# Patient Record
Sex: FEMALE | Race: WHITE | NOT HISPANIC OR LATINO | Employment: FULL TIME | URBAN - METROPOLITAN AREA
[De-identification: names, ages, dates, MRNs, and addresses within clinical notes are randomized per-mention and may not be internally consistent; named-entity substitution may affect disease eponyms.]

---

## 2021-11-12 ENCOUNTER — OFFICE VISIT (OUTPATIENT)
Dept: FAMILY MEDICINE CLINIC | Facility: CLINIC | Age: 36
End: 2021-11-12
Payer: COMMERCIAL

## 2021-11-12 VITALS
DIASTOLIC BLOOD PRESSURE: 70 MMHG | SYSTOLIC BLOOD PRESSURE: 114 MMHG | OXYGEN SATURATION: 99 % | BODY MASS INDEX: 21.86 KG/M2 | WEIGHT: 131.2 LBS | HEIGHT: 65 IN | TEMPERATURE: 98.6 F | HEART RATE: 77 BPM

## 2021-11-12 DIAGNOSIS — M79.674 PAIN OF TOE OF RIGHT FOOT: Primary | ICD-10-CM

## 2021-11-12 PROBLEM — M79.675 PAIN OF TOE OF LEFT FOOT: Status: ACTIVE | Noted: 2021-11-12

## 2021-11-12 PROCEDURE — 99214 OFFICE O/P EST MOD 30 MIN: CPT | Performed by: FAMILY MEDICINE

## 2021-11-19 ENCOUNTER — OFFICE VISIT (OUTPATIENT)
Dept: FAMILY MEDICINE CLINIC | Facility: CLINIC | Age: 36
End: 2021-11-19
Payer: COMMERCIAL

## 2021-11-19 VITALS
WEIGHT: 129 LBS | DIASTOLIC BLOOD PRESSURE: 64 MMHG | OXYGEN SATURATION: 99 % | TEMPERATURE: 98.5 F | HEART RATE: 72 BPM | BODY MASS INDEX: 21.49 KG/M2 | RESPIRATION RATE: 16 BRPM | HEIGHT: 65 IN | SYSTOLIC BLOOD PRESSURE: 120 MMHG

## 2021-11-19 DIAGNOSIS — Z13.0 SCREENING FOR DEFICIENCY ANEMIA: ICD-10-CM

## 2021-11-19 DIAGNOSIS — Z13.1 SCREENING FOR DIABETES MELLITUS (DM): ICD-10-CM

## 2021-11-19 DIAGNOSIS — F33.1 MODERATE EPISODE OF RECURRENT MAJOR DEPRESSIVE DISORDER (HCC): Primary | ICD-10-CM

## 2021-11-19 DIAGNOSIS — Z13.220 SCREENING FOR HYPERLIPIDEMIA: ICD-10-CM

## 2021-11-19 PROBLEM — F32.A DEPRESSION: Status: ACTIVE | Noted: 2021-10-18

## 2021-11-19 PROCEDURE — 99204 OFFICE O/P NEW MOD 45 MIN: CPT | Performed by: FAMILY MEDICINE

## 2021-11-19 PROCEDURE — 3725F SCREEN DEPRESSION PERFORMED: CPT | Performed by: FAMILY MEDICINE

## 2021-11-19 PROCEDURE — 1036F TOBACCO NON-USER: CPT | Performed by: FAMILY MEDICINE

## 2021-11-19 RX ORDER — RIBOFLAVIN (VITAMIN B2) 100 MG
100 TABLET ORAL DAILY
COMMUNITY
End: 2021-11-29 | Stop reason: HOSPADM

## 2021-11-19 RX ORDER — ESCITALOPRAM OXALATE 5 MG/1
5 TABLET ORAL DAILY
Qty: 30 TABLET | Refills: 5 | Status: SHIPPED | OUTPATIENT
Start: 2021-11-19 | End: 2021-12-10 | Stop reason: SDUPTHER

## 2021-11-19 RX ORDER — ZINC GLUCONATE 50 MG
50 TABLET ORAL DAILY
COMMUNITY

## 2021-11-19 RX ORDER — MULTIVITAMIN
1 TABLET ORAL DAILY
COMMUNITY

## 2021-11-22 ENCOUNTER — TELEPHONE (OUTPATIENT)
Dept: PSYCHIATRY | Facility: CLINIC | Age: 36
End: 2021-11-22

## 2021-11-22 ENCOUNTER — TELEPHONE (OUTPATIENT)
Dept: FAMILY MEDICINE CLINIC | Facility: CLINIC | Age: 36
End: 2021-11-22

## 2021-11-23 ENCOUNTER — APPOINTMENT (OUTPATIENT)
Dept: LAB | Age: 36
End: 2021-11-23
Payer: COMMERCIAL

## 2021-11-23 DIAGNOSIS — Z13.0 SCREENING FOR DEFICIENCY ANEMIA: ICD-10-CM

## 2021-11-23 DIAGNOSIS — Z13.220 SCREENING FOR HYPERLIPIDEMIA: ICD-10-CM

## 2021-11-23 DIAGNOSIS — Z13.1 SCREENING FOR DIABETES MELLITUS (DM): ICD-10-CM

## 2021-11-23 DIAGNOSIS — F33.1 MODERATE EPISODE OF RECURRENT MAJOR DEPRESSIVE DISORDER (HCC): ICD-10-CM

## 2021-11-23 LAB
ALBUMIN SERPL BCP-MCNC: 4.1 G/DL (ref 3.5–5)
ALP SERPL-CCNC: 51 U/L (ref 46–116)
ALT SERPL W P-5'-P-CCNC: 19 U/L (ref 12–78)
ANION GAP SERPL CALCULATED.3IONS-SCNC: 9 MMOL/L (ref 4–13)
AST SERPL W P-5'-P-CCNC: 14 U/L (ref 5–45)
BASOPHILS # BLD AUTO: 0.06 THOUSANDS/ΜL (ref 0–0.1)
BASOPHILS NFR BLD AUTO: 1 % (ref 0–1)
BILIRUB SERPL-MCNC: 0.49 MG/DL (ref 0.2–1)
BUN SERPL-MCNC: 5 MG/DL (ref 5–25)
CALCIUM SERPL-MCNC: 10 MG/DL (ref 8.3–10.1)
CHLORIDE SERPL-SCNC: 105 MMOL/L (ref 100–108)
CHOLEST SERPL-MCNC: 146 MG/DL
CO2 SERPL-SCNC: 25 MMOL/L (ref 21–32)
CREAT SERPL-MCNC: 0.65 MG/DL (ref 0.6–1.3)
EOSINOPHIL # BLD AUTO: 0.07 THOUSAND/ΜL (ref 0–0.61)
EOSINOPHIL NFR BLD AUTO: 1 % (ref 0–6)
ERYTHROCYTE [DISTWIDTH] IN BLOOD BY AUTOMATED COUNT: 12.1 % (ref 11.6–15.1)
GFR SERPL CREATININE-BSD FRML MDRD: 116 ML/MIN/1.73SQ M
GLUCOSE P FAST SERPL-MCNC: 81 MG/DL (ref 65–99)
HCT VFR BLD AUTO: 40.8 % (ref 34.8–46.1)
HDLC SERPL-MCNC: 71 MG/DL
HGB BLD-MCNC: 13.1 G/DL (ref 11.5–15.4)
IMM GRANULOCYTES # BLD AUTO: 0.01 THOUSAND/UL (ref 0–0.2)
IMM GRANULOCYTES NFR BLD AUTO: 0 % (ref 0–2)
LDLC SERPL CALC-MCNC: 68 MG/DL (ref 0–100)
LYMPHOCYTES # BLD AUTO: 1.44 THOUSANDS/ΜL (ref 0.6–4.47)
LYMPHOCYTES NFR BLD AUTO: 27 % (ref 14–44)
MCH RBC QN AUTO: 31 PG (ref 26.8–34.3)
MCHC RBC AUTO-ENTMCNC: 32.1 G/DL (ref 31.4–37.4)
MCV RBC AUTO: 97 FL (ref 82–98)
MONOCYTES # BLD AUTO: 0.53 THOUSAND/ΜL (ref 0.17–1.22)
MONOCYTES NFR BLD AUTO: 10 % (ref 4–12)
NEUTROPHILS # BLD AUTO: 3.32 THOUSANDS/ΜL (ref 1.85–7.62)
NEUTS SEG NFR BLD AUTO: 61 % (ref 43–75)
NONHDLC SERPL-MCNC: 75 MG/DL
NRBC BLD AUTO-RTO: 0 /100 WBCS
PLATELET # BLD AUTO: 238 THOUSANDS/UL (ref 149–390)
PMV BLD AUTO: 10.9 FL (ref 8.9–12.7)
POTASSIUM SERPL-SCNC: 3.9 MMOL/L (ref 3.5–5.3)
PROT SERPL-MCNC: 7 G/DL (ref 6.4–8.2)
RBC # BLD AUTO: 4.23 MILLION/UL (ref 3.81–5.12)
SODIUM SERPL-SCNC: 139 MMOL/L (ref 136–145)
TRIGL SERPL-MCNC: 34 MG/DL
TSH SERPL DL<=0.05 MIU/L-ACNC: 1.19 UIU/ML (ref 0.36–3.74)
WBC # BLD AUTO: 5.43 THOUSAND/UL (ref 4.31–10.16)

## 2021-11-23 PROCEDURE — 84443 ASSAY THYROID STIM HORMONE: CPT

## 2021-11-23 PROCEDURE — 36415 COLL VENOUS BLD VENIPUNCTURE: CPT

## 2021-11-23 PROCEDURE — 85025 COMPLETE CBC W/AUTO DIFF WBC: CPT

## 2021-11-23 PROCEDURE — 80053 COMPREHEN METABOLIC PANEL: CPT

## 2021-11-23 PROCEDURE — 80061 LIPID PANEL: CPT

## 2021-11-29 ENCOUNTER — ANNUAL EXAM (OUTPATIENT)
Dept: OBGYN CLINIC | Facility: CLINIC | Age: 36
End: 2021-11-29
Payer: COMMERCIAL

## 2021-11-29 VITALS
WEIGHT: 131 LBS | DIASTOLIC BLOOD PRESSURE: 60 MMHG | BODY MASS INDEX: 21.83 KG/M2 | HEIGHT: 65 IN | SYSTOLIC BLOOD PRESSURE: 112 MMHG

## 2021-11-29 DIAGNOSIS — Z01.419 WOMEN'S ANNUAL ROUTINE GYNECOLOGICAL EXAMINATION: Primary | ICD-10-CM

## 2021-11-29 PROCEDURE — G0476 HPV COMBO ASSAY CA SCREEN: HCPCS | Performed by: NURSE PRACTITIONER

## 2021-11-29 PROCEDURE — S0610 ANNUAL GYNECOLOGICAL EXAMINA: HCPCS | Performed by: NURSE PRACTITIONER

## 2021-11-29 PROCEDURE — G0145 SCR C/V CYTO,THINLAYER,RESCR: HCPCS | Performed by: NURSE PRACTITIONER

## 2021-11-29 PROCEDURE — 3008F BODY MASS INDEX DOCD: CPT | Performed by: FAMILY MEDICINE

## 2021-11-29 RX ORDER — MULTIVIT WITH MINERALS/LUTEIN
TABLET ORAL
COMMUNITY

## 2021-11-30 LAB
HPV HR 12 DNA CVX QL NAA+PROBE: NEGATIVE
HPV16 DNA CVX QL NAA+PROBE: NEGATIVE
HPV18 DNA CVX QL NAA+PROBE: NEGATIVE

## 2021-12-01 LAB
LAB AP GYN PRIMARY INTERPRETATION: NORMAL
LAB AP LMP: NORMAL
Lab: NORMAL
PATH INTERP SPEC-IMP: NORMAL

## 2021-12-02 ENCOUNTER — TELEPHONE (OUTPATIENT)
Dept: OBGYN CLINIC | Facility: CLINIC | Age: 36
End: 2021-12-02

## 2021-12-08 ENCOUNTER — RA CDI HCC (OUTPATIENT)
Dept: OTHER | Facility: HOSPITAL | Age: 36
End: 2021-12-08

## 2021-12-09 ENCOUNTER — SOCIAL WORK (OUTPATIENT)
Dept: BEHAVIORAL/MENTAL HEALTH CLINIC | Facility: CLINIC | Age: 36
End: 2021-12-09
Payer: COMMERCIAL

## 2021-12-09 DIAGNOSIS — F32.1 CURRENT MODERATE EPISODE OF MAJOR DEPRESSIVE DISORDER WITHOUT PRIOR EPISODE (HCC): Primary | ICD-10-CM

## 2021-12-09 PROCEDURE — 90834 PSYTX W PT 45 MINUTES: CPT | Performed by: SOCIAL WORKER

## 2021-12-10 ENCOUNTER — TELEPHONE (OUTPATIENT)
Dept: FAMILY MEDICINE CLINIC | Facility: CLINIC | Age: 36
End: 2021-12-10

## 2021-12-17 ENCOUNTER — TELEMEDICINE (OUTPATIENT)
Dept: FAMILY MEDICINE CLINIC | Facility: CLINIC | Age: 36
End: 2021-12-17
Payer: COMMERCIAL

## 2021-12-17 VITALS — WEIGHT: 131 LBS | HEIGHT: 65 IN | BODY MASS INDEX: 21.83 KG/M2

## 2021-12-17 DIAGNOSIS — F32.1 CURRENT MODERATE EPISODE OF MAJOR DEPRESSIVE DISORDER WITHOUT PRIOR EPISODE (HCC): Primary | ICD-10-CM

## 2021-12-17 PROCEDURE — 3008F BODY MASS INDEX DOCD: CPT | Performed by: FAMILY MEDICINE

## 2021-12-17 PROCEDURE — 3725F SCREEN DEPRESSION PERFORMED: CPT | Performed by: FAMILY MEDICINE

## 2021-12-17 PROCEDURE — 1036F TOBACCO NON-USER: CPT | Performed by: FAMILY MEDICINE

## 2021-12-17 PROCEDURE — 99212 OFFICE O/P EST SF 10 MIN: CPT | Performed by: FAMILY MEDICINE

## 2022-01-19 ENCOUNTER — TELEMEDICINE (OUTPATIENT)
Dept: FAMILY MEDICINE CLINIC | Facility: CLINIC | Age: 37
End: 2022-01-19
Payer: COMMERCIAL

## 2022-01-19 VITALS — WEIGHT: 131 LBS | BODY MASS INDEX: 21.83 KG/M2 | HEIGHT: 65 IN

## 2022-01-19 DIAGNOSIS — F33.1 MODERATE EPISODE OF RECURRENT MAJOR DEPRESSIVE DISORDER (HCC): ICD-10-CM

## 2022-01-19 DIAGNOSIS — F32.1 CURRENT MODERATE EPISODE OF MAJOR DEPRESSIVE DISORDER WITHOUT PRIOR EPISODE (HCC): Primary | ICD-10-CM

## 2022-01-19 PROCEDURE — 99214 OFFICE O/P EST MOD 30 MIN: CPT | Performed by: FAMILY MEDICINE

## 2022-01-19 PROCEDURE — 3008F BODY MASS INDEX DOCD: CPT | Performed by: FAMILY MEDICINE

## 2022-01-19 PROCEDURE — 3725F SCREEN DEPRESSION PERFORMED: CPT | Performed by: FAMILY MEDICINE

## 2022-01-19 PROCEDURE — 1036F TOBACCO NON-USER: CPT | Performed by: FAMILY MEDICINE

## 2022-01-19 RX ORDER — ESCITALOPRAM OXALATE 5 MG/1
15 TABLET ORAL DAILY
Qty: 90 TABLET | Refills: 5 | Status: SHIPPED | OUTPATIENT
Start: 2022-01-19 | End: 2022-02-18

## 2022-01-19 NOTE — PROGRESS NOTES
Virtual Regular Visit    Verification of patient location:    Patient is located in the following state in which I hold an active license [place]      Assessment/Plan:    Problem List Items Addressed This Visit        Other    Depression - Primary     Symptoms continue to improve  She is going to reestablish care with her previous therapist  Will trial an increase in lexapro to 15 and if there is no change at the next visit will reduce back to 10  There are some difficult life changing decisions that Marck Stahl is working out which are likely contributing to symptoms  One goal is finding enjoyment in her previously hobbies like cooking  Fu in 6 weeks  Relevant Medications    escitalopram (LEXAPRO) 5 mg tablet               Reason for visit is   Chief Complaint   Patient presents with    Virtual Brief Visit    Virtual Regular Visit        Encounter provider Chante Rico MD    Provider located at [address] [address]      Recent Visits  No visits were found meeting these conditions  Showing recent visits within past 7 days and meeting all other requirements  Today's Visits  Date Type Provider Dept   01/19/22 Telemedicine Chante Rico MD [facility] today's visits and meeting all other requirements  Future Appointments  No visits were found meeting these conditions  Showing future appointments within next 150 days and meeting all other requirements       The patient was identified by name and date of birth  Madiha Jeffrey was informed that this is a telemedicine visit and that the visit is being conducted through [address] [place] [address] Now and patient was informed that this is a secure, HIPAA-compliant platform  She agrees to proceed     My office door was closed  No one else was in the room  She acknowledged consent and understanding of privacy and security of the video platform   The patient has agreed to participate and understands they can discontinue the visit at any time  Patient is aware this is a billable service  Josh Murillo is a 31 y o  female with a ho depression  HPI     This is a pleasant 32 yo F here for a follow up of depression  She was diagnosed for the first time 11/2021  Symptoms started after the birth of her daughter 2 years ago  She had a midwife so never followed up for a postpartum exam and postpartum depression went untreated  She was having trouble enjoying being a mother and was unhappy in her marriage which causes a lot of guilt  She was started on lexapro 5 which was increased to 10 two weeks later due to ineffectiveness  She has had her first appt with baby and me but does not want to follow up with them again  She has the phone number to contact psych if this is needed in the future  She is going to reestablish care with her previous therapist      Today she reports that she thinks the medication has been helpful  She is still trying to work out the next steps in her marriage  The PHQ 9 has been improving from 15-->10-->8  GAD7 has been negative  Past Medical History:   Diagnosis Date    Miscarriage        Past Surgical History:   Procedure Laterality Date    WISDOM TOOTH EXTRACTION         Current Outpatient Medications   Medication Sig Dispense Refill    Ascorbic Acid (vitamin C) 1000 MG tablet       Cholecalciferol 125 MCG (5000 UT) capsule Take 5,000 Units by mouth daily      escitalopram (LEXAPRO) 5 mg tablet Take 3 tablets (15 mg total) by mouth daily 90 tablet 5    Multiple Vitamin (multivitamin) tablet Take 1 tablet by mouth daily      zinc gluconate 50 mg tablet Take 50 mg by mouth daily       No current facility-administered medications for this visit  No Known Allergies    Review of Systems   Constitutional: Negative for fever and unexpected weight change  HENT: Negative for ear pain, sore throat and trouble swallowing      Eyes: Negative for pain and visual disturbance  Respiratory: Negative for cough, chest tightness, shortness of breath and wheezing  Cardiovascular: Negative for chest pain  Gastrointestinal: Negative for abdominal distention, abdominal pain, blood in stool, constipation, diarrhea, nausea and vomiting  Endocrine: Negative for polydipsia and polyuria  Genitourinary: Negative for dysuria and hematuria  Musculoskeletal: Negative for back pain and myalgias  Skin: Negative for rash  Neurological: Negative for syncope and headaches  Psychiatric/Behavioral: Negative for suicidal ideas  Video Exam    Vitals:    01/19/22 0832   Weight: 59 4 kg (131 lb)   Height: 5' 5" (1 651 m)       Physical Exam   It was my intent to perform this visit via video technology but the patient was not able to do a video connection so the visit was completed via audio telephone only  I spent 20 minutes with patient today in which greater than 50% of the time was spent in counseling/coordination of care regarding depression    VIRTUAL VISIT 53 Starla Orona verbally agrees to participate in [employer]  Pt is aware that [employer] could be limited without vital signs or the ability to perform a full hands-on physical Mustapha Adams understands she or the provider may request at any time to terminate the video visit and request the patient to seek care or treatment in person

## 2022-01-19 NOTE — ASSESSMENT & PLAN NOTE
Symptoms continue to improve  She is going to reestablish care with her previous therapist  Will trial an increase in lexapro to 15 and if there is no change at the next visit will reduce back to 10  There are some difficult life changing decisions that Bennett Villa is working out which are likely contributing to symptoms  One goal is finding enjoyment in her previously hobbies like cooking  Fu in 6 weeks

## 2022-02-23 ENCOUNTER — TELEPHONE (OUTPATIENT)
Dept: PSYCHIATRY | Facility: CLINIC | Age: 37
End: 2022-02-23

## 2022-03-17 ENCOUNTER — OFFICE VISIT (OUTPATIENT)
Dept: FAMILY MEDICINE CLINIC | Facility: CLINIC | Age: 37
End: 2022-03-17
Payer: COMMERCIAL

## 2022-03-17 VITALS
WEIGHT: 134 LBS | SYSTOLIC BLOOD PRESSURE: 122 MMHG | RESPIRATION RATE: 16 BRPM | OXYGEN SATURATION: 98 % | BODY MASS INDEX: 22.33 KG/M2 | TEMPERATURE: 99.1 F | HEART RATE: 108 BPM | DIASTOLIC BLOOD PRESSURE: 60 MMHG | HEIGHT: 65 IN

## 2022-03-17 DIAGNOSIS — F32.1 CURRENT MODERATE EPISODE OF MAJOR DEPRESSIVE DISORDER WITHOUT PRIOR EPISODE (HCC): ICD-10-CM

## 2022-03-17 DIAGNOSIS — Z00.00 ANNUAL PHYSICAL EXAM: Primary | ICD-10-CM

## 2022-03-17 DIAGNOSIS — Z13.1 SCREENING FOR DIABETES MELLITUS (DM): ICD-10-CM

## 2022-03-17 DIAGNOSIS — F33.9 DEPRESSION, RECURRENT (HCC): ICD-10-CM

## 2022-03-17 PROCEDURE — 99213 OFFICE O/P EST LOW 20 MIN: CPT | Performed by: FAMILY MEDICINE

## 2022-03-17 PROCEDURE — 1036F TOBACCO NON-USER: CPT | Performed by: FAMILY MEDICINE

## 2022-03-17 PROCEDURE — 99395 PREV VISIT EST AGE 18-39: CPT | Performed by: FAMILY MEDICINE

## 2022-03-17 PROCEDURE — 3725F SCREEN DEPRESSION PERFORMED: CPT | Performed by: FAMILY MEDICINE

## 2022-03-17 PROCEDURE — 3008F BODY MASS INDEX DOCD: CPT | Performed by: FAMILY MEDICINE

## 2022-03-17 NOTE — PROGRESS NOTES
Assessment/Plan:    Depression  Symptoms continue to improve on lexapro 15  There are some difficult life changing decisions that Romeo Gregorio is working out which are likely contributing to symptoms  She has started cooking again  At this point will keep dosage the same  Referred to behavioral therapy  Fu in 6 months  Annual physical exam  Normal physical exam   Screening labs completed in November 2021 with a normal CMP, lipid profile, CBC and TSH  No family history of breast cancer colon cancer  Pap UTD  Diagnoses and all orders for this visit:    Annual physical exam    Current moderate episode of major depressive disorder without prior episode ([address] )    Screening for diabetes mellitus (DM)  -     Basic metabolic panel; Future    Depression, recurrent (HCC)          Subjective:  Depression follow-up     Patient ID: Madi Aaron is a 30 y o  female  HPI  This is a pleasant 30 yo F here for a follow up of depression  She was diagnosed for the first time 11/2021  Symptoms started after the birth of her daughter 2 years ago  She had a midwife so never followed up for a postpartum exam and postpartum depression went untreated  She was having trouble enjoying being a mother and was unhappy in her marriage which causes a lot of guilt  She was started on lexapro 5 which was increased to 10 two weeks later due to ineffectiveness  She has had her first appt with baby and me but does not want to follow up with them again  She has the phone number to contact psych if this is needed in the future  She is going to reestablish care with her previous therapist       Today she reports that she thinks the medication has been helpful  She is still trying to work out the next steps in her marriage  The PHQ 9 has been improving from 15-->10-->8-->7  GAD7 has been negative          The following portions of the patient's history were reviewed and updated as appropriate: allergies, current medications, past family history, past medical history, past social history, past surgical history and problem list     Review of Systems   Constitutional: Negative for fever and unexpected weight change  HENT: Negative for ear pain, sore throat and trouble swallowing  Eyes: Negative for pain and visual disturbance  Respiratory: Negative for cough, chest tightness, shortness of breath and wheezing  Cardiovascular: Negative for chest pain  Gastrointestinal: Negative for abdominal distention, abdominal pain, blood in stool, constipation, diarrhea, nausea and vomiting  Endocrine: Negative for polydipsia and polyuria  Genitourinary: Negative for dysuria and hematuria  Musculoskeletal: Negative for back pain and myalgias  Skin: Negative for rash  Neurological: Negative for syncope and headaches  Psychiatric/Behavioral: Negative for suicidal ideas  PHQ-2/9 Depression Screening    Little interest or pleasure in doing things: 1 - several days  Feeling down, depressed, or hopeless: 1 - several days  Trouble falling or staying asleep, or sleeping too much: 3 - nearly every day  Feeling tired or having little energy: 1 - several days  Poor appetite or overeatin - not at all  Feeling bad about yourself - or that you are a failure or have let yourself or your family down: 1 - several days  Trouble concentrating on things, such as reading the newspaper or watching television: 0 - not at all  Moving or speaking so slowly that other people could have noticed   Or the opposite - being so fidgety or restless that you have been moving around a lot more than usual: 0 - not at all  Thoughts that you would be better off dead, or of hurting yourself in some way: 0 - not at all  PHQ-9 Score: 7   PHQ-9 Interpretation: Mild depression            Objective:      /60 (BP Location: Left arm, Patient Position: Sitting, Cuff Size: Adult)   Pulse (!) 108   Temp 99 1 °F (37 3 °C) (Tympanic)   Resp 16   Ht 5' 5" (1 651 m)   Wt 60 8 kg (134 lb)   SpO2 98%   BMI 22 30 kg/m²          Physical Exam  Constitutional:       Appearance: She is well-developed  HENT:      Head: Normocephalic and atraumatic  Right Ear: External ear normal       Left Ear: External ear normal    Eyes:      General: No scleral icterus  Conjunctiva/sclera: Conjunctivae normal    Pulmonary:      Effort: Pulmonary effort is normal  No respiratory distress  Musculoskeletal:      Cervical back: Normal range of motion  Neurological:      Mental Status: She is alert and oriented to person, place, and time

## 2022-03-17 NOTE — PROGRESS NOTES
ADULT ANNUAL [facility]    NAME: Nely Almanzar  AGE: 30 y o  SEX: female  : 1985     DATE: 3/17/2022     Assessment and Plan:     Problem List Items Addressed This Visit        Other    Depression     Symptoms continue to improve on lexapro 15  There are some difficult life changing decisions that Estella Jang is working out which are likely contributing to symptoms  She has started cooking again  At this point will keep dosage the same  Referred to behavioral therapy  Fu in 6 months  Annual physical exam - Primary     Normal physical exam   Screening labs completed in 2021 with a normal CMP, lipid profile, CBC and TSH  No family history of breast cancer colon cancer  Pap UTD  Other Visit Diagnoses     Screening for diabetes mellitus (DM)        Relevant Orders    Basic metabolic panel    Depression, recurrent ([facility] [address] )              Immunizations and preventive care screenings were discussed with patient today  Appropriate education was printed on patient's after visit summary  Vaccines UTD  Counseling:  Alcohol/drug use: discussed moderation in alcohol intake, the recommendations for healthy alcohol use, and avoidance of illicit drug use  Dental Health: discussed importance of regular tooth brushing, flossing, and dental visits  · Exercise: the importance of regular exercise/physical activity was discussed  Recommend exercise 3-5 times per week for at least 30 minutes  Return in about 6 months (around 2022) for depression   Chief Complaint:     Chief Complaint   Patient presents with    Follow-up     Depression       History of Present Illness:     Adult Annual Physical   Patient here for a comprehensive physical exam  The patient reports no problems  Diet and Physical Activity  · Diet/Nutrition: well balanced diet  · Exercise: moderate cardiovascular exercise        Depression Screening  PHQ-2/9 Depression Screening    Little interest or pleasure in doing things: 1 - several days  Feeling down, depressed, or hopeless: 1 - several days  Trouble falling or staying asleep, or sleeping too much: 3 - nearly every day  Feeling tired or having little energy: 1 - several days  Poor appetite or overeatin - not at all  Feeling bad about yourself - or that you are a failure or have let yourself or your family down: 1 - several days  Trouble concentrating on things, such as reading the newspaper or watching television: 0 - not at all  Moving or speaking so slowly that other people could have noticed  Or the opposite - being so fidgety or restless that you have been moving around a lot more than usual: 0 - not at all  Thoughts that you would be better off dead, or of hurting yourself in some way: 0 - not at all  PHQ-9 Score: 7   PHQ-9 Interpretation: Mild depression        General Health  · Sleep: sleeps poorly  Falls asleep easily but wakes up frequently  · Hearing: no issues  · Vision: no vision problems  · Dental: regular dental visits  /GYN Health  · Last menstrual period: 3/13/22  · Contraceptive method: none  · History of STDs?: no      Review of Systems:     Review of Systems   Constitutional: Negative for fever and unexpected weight change  HENT: Negative for ear pain, sore throat and trouble swallowing  Eyes: Negative for pain and visual disturbance  Respiratory: Negative for cough, chest tightness, shortness of breath and wheezing  Cardiovascular: Negative for chest pain  Gastrointestinal: Negative for abdominal distention, abdominal pain, blood in stool, constipation, diarrhea, nausea and vomiting  Endocrine: Negative for polydipsia and polyuria  Genitourinary: Negative for dysuria and hematuria  Musculoskeletal: Negative for back pain and myalgias  Skin: Negative for rash  Neurological: Negative for syncope and headaches     Psychiatric/Behavioral: Negative for suicidal ideas  Past Medical History:     Past Medical History:   Diagnosis Date    Miscarriage       Past Surgical History:     Past Surgical History:   Procedure Laterality Date    WISDOM TOOTH EXTRACTION        Social History:     Social History     Socioeconomic History    Marital status: /Civil Union     Spouse name: None    Number of children: None    Years of education: None    Highest education level: None   Occupational History    None   Tobacco Use    Smoking status: Never Smoker    Smokeless tobacco: Never Used   Vaping Use    Vaping Use: Never used   Substance and Sexual Activity    Alcohol use: Not Currently    Drug use: Not Currently    Sexual activity: Yes     Partners: Male     Birth control/protection: Condom Male   Other Topics Concern    None   Social History Narrative    None     Social Determinants of Health     Financial Resource Strain: Not on file   Food Insecurity: Not on file   Transportation Needs: Not on file   Physical Activity: Not on file   Stress: Not on file   Social Connections: Not on file   Intimate Partner Violence: Not on file   Housing Stability: Not on file      Family History:     Family History   Problem Relation Age of Onset    Emphysema Maternal Grandfather 79    Colon cancer Paternal Grandmother 80      Current Medications:     Current Outpatient Medications   Medication Sig Dispense Refill    Ascorbic Acid (vitamin C) 1000 MG tablet       Cholecalciferol 125 MCG (5000 UT) capsule Take 5,000 Units by mouth daily      escitalopram (LEXAPRO) 5 mg tablet Take 3 tablets (15 mg total) by mouth daily 90 tablet 5    Multiple Vitamin (multivitamin) tablet Take 1 tablet by mouth daily      zinc gluconate 50 mg tablet Take 50 mg by mouth daily       No current facility-administered medications for this visit        Allergies:     No Known Allergies   Physical Exam:     /60 (BP Location: Left arm, Patient Position: Sitting, Cuff Size: Adult)   Pulse (!) 108   Temp 99 1 °F (37 3 °C) (Tympanic)   Resp 16   Ht 5' 5" (1 651 m)   Wt 60 8 kg (134 lb)   SpO2 98%   BMI 22 30 kg/m²     Physical Exam  Constitutional:       Appearance: She is well-developed  HENT:      Head: Normocephalic and atraumatic  Eyes:      General: No scleral icterus  Conjunctiva/sclera: Conjunctivae normal       Pupils: Pupils are equal, round, and reactive to light  Cardiovascular:      Rate and Rhythm: Normal rate and regular rhythm  Heart sounds: Normal heart sounds  No murmur heard  No friction rub  No gallop  Pulmonary:      Effort: Pulmonary effort is normal  No respiratory distress  Breath sounds: No wheezing or rales  Chest:      Chest wall: No tenderness  Abdominal:      General: Bowel sounds are normal  There is no distension  Palpations: Abdomen is soft  There is no mass  Tenderness: There is no abdominal tenderness  Musculoskeletal:         General: Normal range of motion  Cervical back: Normal range of motion and neck supple  Lymphadenopathy:      Cervical: No cervical adenopathy  Skin:     General: Skin is warm and dry  Capillary Refill: Capillary refill takes less than 2 seconds  Findings: No rash  Neurological:      Mental Status: She is alert and oriented to person, place, and time  Cranial Nerves: No cranial nerve deficit            Joana Morelos MD   [address]

## 2022-03-17 NOTE — ASSESSMENT & PLAN NOTE
Symptoms continue to improve on lexapro 15  There are some difficult life changing decisions that Estella Jang is working out which are likely contributing to symptoms  She has started cooking again  At this point will keep dosage the same  Referred to behavioral therapy  Fu in 6 months

## 2022-03-17 NOTE — ASSESSMENT & PLAN NOTE
Normal physical exam   Screening labs completed in November 2021 with a normal CMP, lipid profile, CBC and TSH  No family history of breast cancer colon cancer  Pap UTD

## 2022-04-28 ENCOUNTER — SOCIAL WORK (OUTPATIENT)
Dept: BEHAVIORAL/MENTAL HEALTH CLINIC | Facility: CLINIC | Age: 37
End: 2022-04-28
Payer: COMMERCIAL

## 2022-04-28 DIAGNOSIS — F32.1 CURRENT MODERATE EPISODE OF MAJOR DEPRESSIVE DISORDER WITHOUT PRIOR EPISODE (HCC): Primary | ICD-10-CM

## 2022-04-28 PROCEDURE — 90834 PSYTX W PT 45 MINUTES: CPT | Performed by: SOCIAL WORKER

## 2022-05-12 ENCOUNTER — SOCIAL WORK (OUTPATIENT)
Dept: BEHAVIORAL/MENTAL HEALTH CLINIC | Facility: CLINIC | Age: 37
End: 2022-05-12
Payer: COMMERCIAL

## 2022-05-12 DIAGNOSIS — F32.1 CURRENT MODERATE EPISODE OF MAJOR DEPRESSIVE DISORDER WITHOUT PRIOR EPISODE (HCC): Primary | ICD-10-CM

## 2022-05-12 PROCEDURE — 90834 PSYTX W PT 45 MINUTES: CPT | Performed by: SOCIAL WORKER

## 2022-05-13 NOTE — PSYCH
Psychotherapy Provided: Individual Psychotherapy 45 minutes     Length of time in session: 45 minutes, follow up in 4 weeks time  Goals addressed in session: Goal 1     Pt states she is still reeling from all the stressors in her life  Pt reports she is struggling to cope with feeling like she is failing morally  Pt reports her affair with a work colleague is now out in the open  Pt reports this has not only effected her marriage, but also the person's marriage she had the affair with  Pt reports they also have young children  Pt states she is recognizing she has these self destructive tendencies of latching on to people, and making her happiness about what other people can do for her  Pt states this is why she has tended to bounce from relationship to relationship  Pt states the only reason she feels stuck in her current relationship is because of her young daughter  Pt reports if her daughter was not in the picture, she would have already taken up and left the area  Pt states this is her way of coping  Pt reports she can't do this currently, so she is left being in a toxic relationship with the person who brings the most kady to her about to leave to move back to [facility] [place] with his family  Pt reports in two weeks she feels like she is going to have nothing  Pt reports she is concerned for when her boyfriend leaves the country  Pt states her goal is to get away for weekends to visit friends, and distract herself by being with people who know her well  Pt states she will take her daughter with her for some of these weekends, as she does not want to lose the bond she has with her  Everything else in her life feels very uncertain  Pt reports this makes her feel vulnerable, sad, upset with herself  This writer and pt followed up with further conversation about ERIKA and how pt exhibits many of the symptoms of an ERIKA person   Pt states she did listen to some discussions on this, and she wants to explore it more via youtube videos and podcasts  Pt is also very much aware of her co-dependency issues, which is another symptom of ERIKA  Pt is encouraged to start to process this and identify what she can do for herself which is kind, filled with self acceptance and which can help pt to start to like herself more, and improve overall self esteem  Pt reports she knows this process will be painful, but she wants to move forward in her life  Pt is encouraged to really get the basic right, and continue to work on diet, sleep, exercise, social contact with people, having things to look forward to etc  Pt is encouraged to continue to invest in why she has the tendencies she has of struggling to open up, bouncing from one relationship to the other, poor impulse control, struggling to regulate her emotions etc  Pt to follow up with this writer in 4 weeks time  Current suicide risk : Low     Pt is not suicidal and is future oriented  Behavioral Health Treatment Plan [facility]: Diagnosis and Treatment Plan explained to Gabriela Chairez relates understanding diagnosis and is agreeable to Treatment Plan   Yes

## 2022-06-23 ENCOUNTER — SOCIAL WORK (OUTPATIENT)
Dept: BEHAVIORAL/MENTAL HEALTH CLINIC | Facility: CLINIC | Age: 37
End: 2022-06-23
Payer: COMMERCIAL

## 2022-06-23 DIAGNOSIS — F32.1 CURRENT MODERATE EPISODE OF MAJOR DEPRESSIVE DISORDER WITHOUT PRIOR EPISODE (HCC): Primary | ICD-10-CM

## 2022-06-23 PROCEDURE — 90834 PSYTX W PT 45 MINUTES: CPT | Performed by: SOCIAL WORKER

## 2022-06-26 NOTE — PSYCH
Problem List Items Addressed This Visit        Other    Depression - Primary        Data: Pt presents to this session with down affect, and generally sad, on edge, and frustrated  During the session, it is evident pt still carries a lot of guilt regarding lifestyle choices  Pt reports she feels terrible about treating her  poorly, having an affair with a  man  Pt reports she feels she has done a lot of damage  Pt reports the person she had an affair with has not moved back to [facility]  Pt is aware all the promises her made to her were not realized, and she feels stupid for believed he would leave his wife, for her  Pt reports she feels she is in an unworkable situation, and spends many hours ruminating on what she can do to change her situation  Pt reports she struggles to get to sleep at night, as she spends many long hours thinking about how she can resolve the unresolvable  Pt reports she is co-dependent and she has always looked to be rescued from her situation by someone else  Pt admits she would have already left the area, if she did not have her daughter  Pt states she has always done this in the past      Pt reports on the plus side, having a conversation with her father trying to find out hwy he is so cold and blunted  Pt reports the conversation did not go too well, but she was pleased she told him how she feels  Pt reports she is trying to do this more, and work on avoiding her usual routine of telling people what she feels like they would like to hear  Action: Pt reports not much progress since last session  Pt is able to identify the degree to which she has always run from her problems, and found reasons and excuses to run  Pt states she can't do it this time, and pt is slowly realizing this is probably good, and is going to work some maturity in her  Pt states she wants to start working on herself, and investing in herself   Pt is wanting to work on being honest and true to people around her, and not being super black and white and pretending everything is great until she decides she is done, where everything suddenly is wrong  Pt reports she understands this is how she has hurt people in the past      Plan: Pt's goals for this week is to start to say no to unproductive thought processes regarding the big picture (which can't be resolved)  Instead pt is encouraged to start with small things every day, where she identifies the good things in her life, and where she starts to embrace the people around her  Pt is encouraged to utilize the [facility] acronym and focus on things which are true, helpful, inspirational, kind and necessary  Pt is encouraged to get mindful, and to embrace her current reality and make the most of it  Pt is encouraged to identify those things which helps her mood (yoga, spending time with friends, reading inspirational stories etc)  Pt is encouraged to start to incorporate these on a larger scale  Pt to follow up in 4 weeks time  Psychotherapy Provided: Individual Psychotherapy 45 minutes     Length of time in session: 45 minutes, follow up in 3 weeks time  Goals addressed in session: Goal 1         Pain:      none    0    Current suicide risk : Low     Pt is future oriented and not suicidal      Behavioral Health Treatment Plan [facility]: Diagnosis and Treatment Plan explained to Agatha Burns relates understanding diagnosis and is agreeable to Treatment Plan   Yes

## 2022-07-15 ENCOUNTER — SOCIAL WORK (OUTPATIENT)
Dept: BEHAVIORAL/MENTAL HEALTH CLINIC | Facility: CLINIC | Age: 37
End: 2022-07-15
Payer: COMMERCIAL

## 2022-07-15 DIAGNOSIS — F32.1 CURRENT MODERATE EPISODE OF MAJOR DEPRESSIVE DISORDER WITHOUT PRIOR EPISODE (HCC): Primary | ICD-10-CM

## 2022-07-15 PROCEDURE — 90834 PSYTX W PT 45 MINUTES: CPT | Performed by: SOCIAL WORKER

## 2022-07-22 NOTE — PSYCH
1  Current moderate episode of major depressive disorder without prior episode ([facility] [address] )       Data: Pt is still looking a little shell shocked and numb during initial impression  Pt admits she just got off the phone with the person she had an affair with who is now living back in [facility]  Pt reports for some reason she keeps on picking up his calls even though she knows she does not have a future with him  Pt reports she understands why people have affairs now, and she finds herself humbled by the fact she can't hide away from the fact she has done immoral things  Pt reports she is still finding herself in this liminal space between wanting to correct all her mistakes, but knowing she can't and won't go back to how things were before she had the affair  Pt reports her  is making more of an effort, and this has been making a different, but she feels it is too little too late  Pt reports she just no longer feels attracted to him anymore  Pt still faces the same obstacles of not making enough money to move out, and unsure of what to do about their 1year old daughter  Pt reports thinking about how she can bolster her self care, further to last sessions discussion  Pt reports she is delving into spirituality and she is attracted with the Yazidism sense of mindfulness and centeredness  Pt reports she used to go to [place] with her aunt as a younger girl, but she found the self righteousness of Adventism people a little difficult to accept and does not want to go back  Pt reports she feels self care is the only way she can get through her current struggles  Pt admits she is very unhappy at work, unhappy with home life, has a difficult time with her originating family and feels unhappy living in this area of the world  A lot of unhappiness and disconnectedness   Pt is encouraged to continue to focus on daily tasks, increase her physical exercise, and work at reducing her tendency to try and fix everything (which she can't) and focus on small goals such as reaching out to people and building bridges to other people without excessive trauma dumping  Assessment: Pt is clearly going through a very difficult season with no obvious solutions  Pt is understanding more of how she got herself into this predicament, poor communication, inability to fully express emotions, co-dependent behaviors until she eventually cracked and wanted to feel alive again (affair)  Pt is on a journey of learning to relate to people better and find ways to build bridges towards people and not self isolate or remain emotionally unavailable to people  Pt reports she and her  are getting a long much better now because they are finally both putting the work in, and recognizing they needed help   is on mood stabilizer medication and is working with a therapist and doing much better  Plan: continue spiritual journey, work on making incremental small changes rather than obsessing over the big picture  Follow up in 4 weeks time  Psychotherapy Provided: Individual Psychotherapy 45 minutes     Length of time in session: 45 minutes, follow up in 4 weeks time  Goals addressed in session: Goal 1     Pain:      none    0    Current suicide risk : Low     Pt is future oriented and not suicidal        Behavioral Health Treatment Plan [facility]: Diagnosis and Treatment Plan explained to Samreen Mcdowell relates understanding diagnosis and is agreeable to Treatment Plan   Yes

## 2022-08-18 ENCOUNTER — SOCIAL WORK (OUTPATIENT)
Dept: BEHAVIORAL/MENTAL HEALTH CLINIC | Facility: CLINIC | Age: 37
End: 2022-08-18
Payer: COMMERCIAL

## 2022-08-18 DIAGNOSIS — F33.0 MILD EPISODE OF RECURRENT MAJOR DEPRESSIVE DISORDER (HCC): Primary | ICD-10-CM

## 2022-08-18 PROCEDURE — 90834 PSYTX W PT 45 MINUTES: CPT | Performed by: SOCIAL WORKER

## 2022-08-19 NOTE — PSYCH
1  Mild episode of recurrent major depressive disorder ([facility] [address] )       Data: Pt comes to this session having decompensated from last session  Depressed mood is elevated  Pt reports her  decided to have at least two months of complete separation and felt it was a good idea for them to have some distance  Pt reports she moved in with her Aunt and is going to stay there until early October  Pt reports this has been a shock to the system, and is now presenting much more of the reality of what pt's life would look like without her   This writer processed with pt in regards to what she is contemplating, where her thoughts are, and which thoughts are constructive and which thoughts are destructive  Pt reports she is still very hostile and persecutory to self, and is finding it difficult to forgive herself  Pt reports with the prospect of having to get a small apartment by herself, she is realizing that leaving her  will be a serious net loss for her  However, pt does not feel attracted sexually to her  anymore, but then pt is also thinking is life all about feeling the buzz of love  Is there a practical side to life, too  Pt is encouraged to write down these things on paper, and process whether leaving her  is the most practical solution to her current crisis  This writer and pt also discussed her tendency to avoid and run away and her coping mechanisms for crisis have always been destructive  Pt reports she feels like the grass is always greener on the other side, but when she gets there she thinks the grass is greener where she just came from  Pt reports she feels depressed, unmotivated, lost, sad, tearful  Pt is aware of the need to maintain self care, and she is choosing to go to some concerts, and work on herself  Pt has little pleasure in being around her friends, but she is forcing herself to keep on going       Assessment: Without pt's daughter, pt admits she would have already left and started another relationship with someone, and pushed back her memories of her marriage  The child has provided a lot more accountability to pt and she is having to work though things she typically would have avoided  This writer feels this is a good thing for pt  She is having to face her demons  Pt is also thinking about ways she can adjust and be more accepting of her  and she admits he is more than willing to give marriage a second go  Pt is clearly depressed and tearful and in shock at how her life has turned this corner  Pt is aware she is sown bad seeds, and she is processing what she can do about it  Pt is working on her self care, and this writer encouraged pt to remain hopeful and be around people, and continue to work on self during this process  Plan: Follow up in 4 weeks time  Psychotherapy Provided: Individual Psychotherapy 45 minutes     Length of time in session: 45 minutes, follow up in 4 weeks time  Goals addressed in session: Goal 1     Pain:      none    0    Current suicide risk : Low     Pt is future oriented and not suicidal        Behavioral Health Treatment Plan [facility]: Diagnosis and Treatment Plan explained to Mallika Livingston relates understanding diagnosis and is agreeable to Treatment Plan   Yes

## 2022-08-22 ENCOUNTER — OFFICE VISIT (OUTPATIENT)
Dept: FAMILY MEDICINE CLINIC | Facility: CLINIC | Age: 37
End: 2022-08-22
Payer: COMMERCIAL

## 2022-08-22 VITALS
HEIGHT: 65 IN | TEMPERATURE: 99.9 F | HEART RATE: 60 BPM | DIASTOLIC BLOOD PRESSURE: 78 MMHG | RESPIRATION RATE: 16 BRPM | BODY MASS INDEX: 22.99 KG/M2 | SYSTOLIC BLOOD PRESSURE: 118 MMHG | WEIGHT: 138 LBS

## 2022-08-22 DIAGNOSIS — K42.9 UMBILICAL HERNIA WITHOUT OBSTRUCTION AND WITHOUT GANGRENE: Primary | ICD-10-CM

## 2022-08-22 PROCEDURE — 99213 OFFICE O/P EST LOW 20 MIN: CPT | Performed by: FAMILY MEDICINE

## 2022-08-22 NOTE — PATIENT INSTRUCTIONS
Umbilical Hernia   AMBULATORY CARE:   An umbilical hernia  is a bulge through the abdominal wall near your umbilicus (belly button)  The hernia may contain tissue from the abdomen, part of an organ (such as the intestine), or fluid  Signs and symptoms:  Umbilical hernias usually do not cause any pain  Your hernia may disappear when you lay flat  You may have any of the following:  A bulge or swelling in or near your belly button    A bulge that gets bigger when you cough, strain to have a bowel movement, or sit up    Nausea or vomiting    Constipation    Seek care immediately if:   Your hernia gets bigger, feels firm, or turns blue or purple  You have severe abdominal pain with nausea or vomiting  You stop having bowel movements and passing gas  You have blood in your bowel movement  Contact your healthcare provider if:   You have a fever  You have nausea or are vomiting  You are constipated  You have questions or concerns about your condition or care  Self-care:   Drink more liquids  Liquids may prevent constipation and straining during a bowel movement  This can prevent your hernia from getting bigger  Ask how much liquid you should drink each day and which liquids are best for you  Eat high-fiber foods  Fiber may prevent constipation and straining during a bowel movement  This can prevent your hernia from getting bigger  Foods that contain fiber include fruits, vegetables, legumes, and whole grains  Avoid heavy lifting  Heavy lifting can put pressure on your hernia and make it bigger  Ask your healthcare provider how much is safe to lift  Do not place anything over your umbilical hernia  Do not place tape or a coin over the hernia  This treatment does not help treat a hernia  Follow up with your healthcare provider as directed: You may need to see a surgeon to plan for hernia repair  Write down your questions so you remember to ask them during your visits    © Copyright [employer] 2022 Information is for End User's use only and may not be sold, redistributed or otherwise used for commercial purposes  All illustrations and images included in CareNotes® are the copyrighted property of A D A M , Inc  or Nancy Banuelos  The above information is an  only  It is not intended as medical advice for individual conditions or treatments  Talk to your doctor, nurse or pharmacist before following any medical regimen to see if it is safe and effective for you

## 2022-08-22 NOTE — PROGRESS NOTES
Assessment/Plan:    Umbilical hernia without obstruction and without gangrene  Patient has reducible umbilical hernia  Discussed with patient that given that she is not interested in surgery, would increase fiber in diet and drink plenty of water to stay hydrated  Patient will need to help decrease abdominal pressure to help decrease incidence of herniation of hernia  Once patient decides that she wants surgery or if pain is as severe, would recommend follow-up with a general surgeon for further evaluation and elective ventral hernia repair  Discussed etiology and pathology of umbilical hernias  Additional reading materials given to patient  Follow-up as needed    20 minutes spent with patient  Diagnoses and all orders for this visit:    Umbilical hernia without obstruction and without gangrene        Subjective:   Chief Complaint   Patient presents with    Hernia     By her belly button     Health Maintenance   Topic Date Due    Hepatitis C Screening  Never done    HIV Screening  Never done    DTaP,Tdap,and Td Vaccines (1 - Tdap) Never done    COVID-19 Vaccine (3 - Booster for Moderna series) 09/14/2021    Influenza Vaccine (1) 09/01/2022    BMI: Adult  03/17/2023    Annual Physical  03/17/2023    Cervical Cancer Screening  11/29/2026    Pneumococcal Vaccine: Pediatrics (0 to 5 Years) and At-Risk Patients (6 to 59 Years)  Aged Out    HIB Vaccine  Aged Out    Hepatitis B Vaccine  Aged Out    IPV Vaccine  Aged Out    Hepatitis A Vaccine  Aged Out    Meningococcal ACWY Vaccine  Aged Out    HPV Vaccine  Aged Out        Patient ID: Joselyn Christopher is a 30 y o  female  HPI    Patient presents for follow-up after visiting urgent care for a umbilical hernia  States that she 1st noticed this several weeks ago after lifting heavy boxes  Was told to press on hernia to help reduce it  Does have some slight abdominal discomfort but no significant pain  No OTC medications    Has family history of a family member with a ventral hernia  Not interested in surgery at this time  The following portions of the patient's history were reviewed and updated as appropriate: allergies, current medications, past family history, past medical history, past social history, past surgical history, and problem list     Review of Systems   Constitutional: Negative for chills and fever  Respiratory: Negative for cough and shortness of breath  Cardiovascular: Negative for chest pain and palpitations  Gastrointestinal: Negative for diarrhea, nausea and vomiting  Umbilical hernia   Musculoskeletal: Negative for myalgias  Neurological: Negative for dizziness and headaches  Objective:  /78   Pulse 60   Temp 99 9 °F (37 7 °C) (Tympanic)   Resp 16   Ht 5' 5" (1 651 m)   Wt 62 6 kg (138 lb)   BMI 22 96 kg/m²      Physical Exam  Vitals and nursing note reviewed  Constitutional:       General: She is not in acute distress  Eyes:      Conjunctiva/sclera: Conjunctivae normal    Cardiovascular:      Rate and Rhythm: Normal rate and regular rhythm  Pulses: Normal pulses  Heart sounds: No murmur heard  Pulmonary:      Effort: Pulmonary effort is normal  No respiratory distress  Breath sounds: No wheezing  Abdominal:      Palpations: Abdomen is soft  Comments: Roughly 1 cm umbilical hernia noted  Reducible  No overlying skin changes  Musculoskeletal:         General: No swelling  Neurological:      Mental Status: She is alert  This note has been constructed using a voice recognition system  There may be translation, syntax, or grammatical errors  If you have an questions, please contact the dictating provider

## 2022-08-22 NOTE — ASSESSMENT & PLAN NOTE
Patient has reducible umbilical hernia  Discussed with patient that given that she is not interested in surgery, would increase fiber in diet and drink plenty of water to stay hydrated  Patient will need to help decrease abdominal pressure to help decrease incidence of herniation of hernia  Once patient decides that she wants surgery or if pain is as severe, would recommend follow-up with a general surgeon for further evaluation and elective ventral hernia repair  Discussed etiology and pathology of umbilical hernias  Additional reading materials given to patient     Follow-up as needed

## 2022-09-09 ENCOUNTER — SOCIAL WORK (OUTPATIENT)
Dept: BEHAVIORAL/MENTAL HEALTH CLINIC | Facility: CLINIC | Age: 37
End: 2022-09-09
Payer: COMMERCIAL

## 2022-09-09 DIAGNOSIS — F32.1 DEPRESSION, MAJOR, SINGLE EPISODE, MODERATE (HCC): Primary | ICD-10-CM

## 2022-09-09 PROCEDURE — 90834 PSYTX W PT 45 MINUTES: CPT | Performed by: SOCIAL WORKER

## 2022-09-12 NOTE — PSYCH
1  Depression, major, single episode, moderate (HCC)         Data: Pt reports moving back in with her  after their 3year old was experiencing too much disruption  Pt reports she recognizes that she is living in between two worlds and she does not know the path forward  Pt reports her husbands family have some ill will towards her, and pt understand why  Pt also recognizes she wants the comforts of living at home, yet she also has said she no longer wants to continue with her   However, pt also reports she and her  have never communicated better, and they are going on a co-parenting class together  Pt feels there is more potential for  and pt reconciling than before  Pt reports she is still very persecutory to self  Pt states she does not know how to forgive herself, and feels like she can't do this  Pt recognizes the benefit of having a God who forgives but pt does not have such a belief system  Pt reports she is interested in the meditation, mindfulness, self acceptance aspect of mental health and she is willing to look into some of the evidence on how things like guided meditation and chanting/ yoga can help in these areas  However, pt still feels doing this kind of activity is a little self absorbing, and to someone who is still quite hostile to self, this is a difficult thing to start practicing  However, in this conversation, it is apparent that pt is struggling to come to terms with the fact she does not like herself very much  This is a problem for pt who is trying to work on depression management and improve general self care  Pt recognizes she is a little stuck until she gets over this payal  Pt does agree that we all make mistakes, and have some sort of moral failure in our lives  Pt also accepts that the most important aspect of making mistakes, is learning from them, and being contrite  Pt is in agreement with this too   Pt reports she is still spending a lot of time ruminating in unhelpful ways in to the night, and her sleep is being impacted  Pt and this writer discussed the importance of using the [address] for improved thinking, and encourages pt to also apply it to her own self  Pt agrees with this  Assessment: Session was productive  Pt has to put into practice better thinking, and improve self acceptance  Pt appears to be looking to atone for her mistakes, but this is not a work which can be accomplished it is an act of choosing  Pt has always identified her worth by what she does, which is why she is having such a hard time accepting herself at present  Pt is encouraged to go on a mindfulness journey and try and improve self love and self acceptance, above any value pt garners from what she does for a living to the talents and gifts she possesses  Plan: Look into meditation, mindfulness literature sent to your inbox  Follow up in 2 weeks time  Psychotherapy Provided: Individual Psychotherapy 45 minutes     Length of time in session: 45 minutes, follow up in 2 weeks time  Goals addressed in session: Goal 1     Pain:      none    0    Current suicide risk : Low     Pt is future oriented and not suicidal        Behavioral Health Treatment Plan [facility]: Diagnosis and Treatment Plan explained to Steven Cyr relates understanding diagnosis and is agreeable to Treatment Plan   Yes

## 2022-10-07 ENCOUNTER — SOCIAL WORK (OUTPATIENT)
Dept: BEHAVIORAL/MENTAL HEALTH CLINIC | Facility: CLINIC | Age: 37
End: 2022-10-07
Payer: COMMERCIAL

## 2022-10-07 DIAGNOSIS — F33.0 MILD EPISODE OF RECURRENT MAJOR DEPRESSIVE DISORDER (HCC): Primary | ICD-10-CM

## 2022-10-07 PROCEDURE — 90834 PSYTX W PT 45 MINUTES: CPT | Performed by: SOCIAL WORKER

## 2022-10-11 NOTE — PSYCH
1  Mild episode of recurrent major depressive disorder ([facility] [address] )       Data: Pt presents to this writer with ongoing depression, mild  Pt states she is doing a little better  Pt reports she is back living with her , and they are starting to share living space again  Pt reports they are communicating well, going to couples therapy, and learning to co-parent  Pt reports the time she had away from her  was helpful, as she realized she wants a family, and she wants her daughter to have two parents  Pt reports she is continuing the processing in regards to what she wants in life  Pt reports she still see the frustration she has with her , who most likely has undiagnosed ADHD, and his behavior frustrated her immensely  However, pt is also recognizing that she has a great house, stability, economic health, and she likes her   This writer suggests, most of pt's frustrations come from some of the selfish aspects her  has, but also the fact she is bored at times in the relationship, and even bored a little, sexually  This writer and pt discussed what she is looking for in life, and that she could easily find a new relationship, which could frustrate her just as much as her current relationship  Pt is encouraged to also look at the filter from which she tends to make many of her choices  Pt tends to be a runner, and always running to the next thing  Pt has grown up in an environment where she never had to be all that accountable  Pt never had a great relationship with her parents and siblings, and for pt most friendships are expendable when the circumstances are not in her favor  Pt is now , and this commitment requires choices pt has not always been good at making  This writer encouraged pt to try and work through this as a character issue, and not just a choice issue   Pt and this writer also discussed how ADHD works, and the importance of changing expectations living with someone with ADHD, and methods to increase success in the relationship with accountability, and scaffolding  Pt finds this frustrating, as she does not want to be the person who hold her  accountable, as she tends to avoid conflict  This writer suggests this is probably the reason the relationship is struggling, because her  needs radical accountability and short term reward to keep him motivated  Pt is encouraged to follow up with marriage counselor in regard to this  Assessment: This writer is gently trying to give pt an opportunity to see some of her selfishness with some of her decision making and expectations  Pt is starting to see that the way she has led her life up until marriage is not going to work for a marriage commitment  At some point, pt has to make a choice as to what kind of life she wants to build  Plan: Follow up with this writer in 4 weeks time  Psychotherapy Provided: Individual Psychotherapy 45 minutes     Length of time in session: 45 minutes from 3:05 to 3:50, follow up in 4 weeks time  Goals addressed in session: Goal 1     Pain:      none    0    Current suicide risk : Low     Pt is future oriented and not suicidal        Behavioral Health Treatment Plan [facility]: Diagnosis and Treatment Plan explained to Boris Licea relates understanding diagnosis and is agreeable to Treatment Plan   Yes

## 2022-11-09 ENCOUNTER — ANNUAL EXAM (OUTPATIENT)
Dept: OBGYN CLINIC | Facility: CLINIC | Age: 37
End: 2022-11-09

## 2022-11-09 VITALS — BODY MASS INDEX: 23.46 KG/M2 | WEIGHT: 141 LBS | SYSTOLIC BLOOD PRESSURE: 120 MMHG | DIASTOLIC BLOOD PRESSURE: 70 MMHG

## 2022-11-09 DIAGNOSIS — Z01.419 WOMEN'S ANNUAL ROUTINE GYNECOLOGICAL EXAMINATION: Primary | ICD-10-CM

## 2022-11-09 PROBLEM — M79.674 PAIN OF TOE OF RIGHT FOOT: Status: RESOLVED | Noted: 2021-11-12 | Resolved: 2022-11-09

## 2022-11-09 NOTE — PROGRESS NOTES
Subjective    HPI:     Jeff Roblero is a 30 y o  female  She is a [place] 2 Para 1, with a  3 years ago  Her menstrual cycles are regular and predictable  Her current method of contraception includes condoms  She denies issues with intimacy  She denies /GI and Gyn complaints  She feels safe at home  She states her depression/anxiety is stable  She sees her counselor 2-3 weeks  Medical, surgical and family history reviewed  Her dental care is up-to-date  She eats a healthy diet and exercises  She is not happy with her weight  Gynecologic History    Patient's last menstrual period was 10/20/2022  Last Pap: 21  Results were: normal    Obstetric History    OB History    Para Term  AB Living   2 1 1   1 1   SAB IAB Ectopic Multiple Live Births   1       1      # Outcome Date GA Lbr Morales/2nd Weight Sex Delivery Anes PTL Lv   2 Term 01/10/19 41w0d  3884 g (8 lb 9 oz) F Vag-Spont  Y NADIRA   1 SAB                The following portions of the patient's history were reviewed and updated as appropriate: allergies, current medications, past family history, past medical history, past social history, past surgical history and problem list     Review of Systems    Pertinent items are noted in HPI  Objective    Physical Exam  Constitutional:       Appearance: Normal appearance  She is well-developed  Genitourinary:      Vulva, bladder and urethral meatus normal       No lesions in the vagina  Right Labia: No rash, tenderness, lesions, skin changes or Bartholin's cyst      Left Labia: No tenderness, lesions, skin changes, Bartholin's cyst or rash  No labial fusion noted  No inguinal adenopathy present in the right or left side  No vaginal discharge, erythema, tenderness, bleeding or granulation tissue  No vaginal prolapse present  No vaginal atrophy present  Right Adnexa: not tender, not full and no mass present       Left Adnexa: not tender, not full and no mass present  Cervix is parous  No cervical motion tenderness, discharge, friability, lesion, polyp or nabothian cyst       Uterus is not enlarged, tender, irregular or prolapsed  No uterine mass detected  Uterus is anteverted  Pelvic exam was performed with patient in the lithotomy position  Breasts: Breasts are symmetrical       Right: No inverted nipple, mass, nipple discharge, skin change, tenderness, axillary adenopathy or supraclavicular adenopathy  Left: No inverted nipple, mass, nipple discharge, skin change, tenderness, axillary adenopathy or supraclavicular adenopathy  HENT:      Head: Normocephalic and atraumatic  Neck:      Thyroid: No thyromegaly  Cardiovascular:      Rate and Rhythm: Normal rate and regular rhythm  Heart sounds: Normal heart sounds, S1 normal and S2 normal    Pulmonary:      Effort: Pulmonary effort is normal       Breath sounds: Normal breath sounds  Abdominal:      General: Bowel sounds are normal  There is no distension  Palpations: Abdomen is soft  There is no mass  Tenderness: There is no abdominal tenderness  There is no guarding  Hernia: There is no hernia in the left inguinal area or right inguinal area  Musculoskeletal:      Cervical back: Neck supple  Lymphadenopathy:      Cervical: No cervical adenopathy  Upper Body:      Right upper body: No supraclavicular or axillary adenopathy  Left upper body: No supraclavicular or axillary adenopathy  Lower Body: No right inguinal adenopathy  No left inguinal adenopathy  Neurological:      Mental Status: She is alert  Skin:     General: Skin is warm and dry  Findings: No rash  Psychiatric:         Attention and Perception: Attention and perception normal          Mood and Affect: Mood and affect normal          Speech: Speech normal          Behavior: Behavior is cooperative  Thought Content:  Thought content normal          Cognition and Memory: Cognition and memory normal          Judgment: Judgment normal    Vitals and nursing note reviewed  Assessment and Plan    Romeo Zuluaga was seen today for gynecologic exam     Diagnoses and all orders for this visit:    Women's annual routine gynecological examination      Patient informed of a Stable GYN exam  A pap smear was not performed due to a normal pap in 2021  I have discussed the importance of exercise and healthy diet as well as adequate intake of calcium and vitamin D  The current ASCCP guidelines were reviewed  The low risk patient will receive pap smear screening every 3 years until the age of 34 and then every 3 to 5 years with HPV co-testing from the ages of 33-67  I emphasized the importance of an annual pelvic and breast exam  A yearly mammogram is recommended for breast cancer screening starting at age 36  Results will be released to [facility], if abnormal will call to review and discuss treatment plan  All questions have been answered to her satisfaction  Contraception: condoms  Follow up in: 1 year or sooner if needed

## 2022-11-17 ENCOUNTER — SOCIAL WORK (OUTPATIENT)
Dept: BEHAVIORAL/MENTAL HEALTH CLINIC | Facility: CLINIC | Age: 37
End: 2022-11-17

## 2022-11-17 DIAGNOSIS — F32.1 CURRENT MODERATE EPISODE OF MAJOR DEPRESSIVE DISORDER WITHOUT PRIOR EPISODE (HCC): Primary | ICD-10-CM

## 2022-11-18 NOTE — PSYCH
1  Current moderate episode of major depressive disorder without prior episode ([facility] [address] )          Data: Pt reports increase in depression over the last 2 to 3 weeks  Pt reports she moved back in with her , and pt reports their marriage is going back to the old ways   has stopped going to therapy, no longer taking medicine  Pt reports she feels very frustrated   is no longer working on himself in a way that pt feels is showing her he cares  Pt reports she is also finding herself in a difficult position of fighting his low self esteem, where she is trying to be honest with him, which is back firing, as he goes into a self pity tirade which becomes counter productive  Pt also admits she can be type a and have very high standards  This appears to cause problems in the marriage  Her  is also no longer going to therapy, and this is something pt feels very strongly would help her   Pt reports as she watches her  decompensate, she is also struggling to feel hopeful about her current life  Pt endorses poor motivation, negative thoughts, feelings of isolation, sadness, frustration and anger  Pt has changed her job, and she feels this will be a positive move in her life  New job starts on Monday  Pt and this writer discussed the importance of pt continuing to work on herself  Pt still has a lot of guilt, and a lot of guilt surrounding the care of her daughter  Pt has been neglecting to go to the gym because she feels bad for her   Pt is encouraged to process this, and find solutions by communicating to her  in a way he understands  Couples counseling is also recommended for pt as this seems to be the major trigger of much of her depression  Pt is encouraged to develop a road map of self care, which supports her own needs while also building time into her life to talk to her  and work to support and encourage him       Assessment: Pt has decompensated since last session, which appears to be a reflection on her frustration with her marriage  Pt has also been neglecting herself, and her own self care in this process  Pt recognizes what she is doing, and that she is falling into previous habits, and is ready to make some changes  Plan: follow up in 2 weeks time  Psychotherapy Provided: Individual Psychotherapy 45 minutes     Length of time in session: 45 minutes, follow up in 4 weeks time  Goals addressed in session: Goal 1     Pain:      none    0    Current suicide risk : Low     Pt is future oriented and not suicidal        Behavioral Health Treatment Plan [facility]: Diagnosis and Treatment Plan explained to Leonela Perry relates understanding diagnosis and is agreeable to Treatment Plan   Yes     Visit start and stop times:    11/18/22  Start Time: 0300  Stop Time: 0345  Total Visit Time: 45 minutes

## 2022-12-02 ENCOUNTER — SOCIAL WORK (OUTPATIENT)
Dept: BEHAVIORAL/MENTAL HEALTH CLINIC | Facility: CLINIC | Age: 37
End: 2022-12-02

## 2022-12-02 DIAGNOSIS — F33.0 MILD EPISODE OF RECURRENT MAJOR DEPRESSIVE DISORDER (HCC): Primary | ICD-10-CM

## 2022-12-14 ENCOUNTER — OFFICE VISIT (OUTPATIENT)
Dept: FAMILY MEDICINE CLINIC | Facility: CLINIC | Age: 37
End: 2022-12-14

## 2022-12-14 VITALS
BODY MASS INDEX: 23.66 KG/M2 | DIASTOLIC BLOOD PRESSURE: 60 MMHG | WEIGHT: 142 LBS | OXYGEN SATURATION: 98 % | RESPIRATION RATE: 16 BRPM | HEART RATE: 75 BPM | TEMPERATURE: 99.1 F | SYSTOLIC BLOOD PRESSURE: 110 MMHG | HEIGHT: 65 IN

## 2022-12-14 DIAGNOSIS — Z13.0 SCREENING FOR DEFICIENCY ANEMIA: ICD-10-CM

## 2022-12-14 DIAGNOSIS — Z00.00 ANNUAL PHYSICAL EXAM: Primary | ICD-10-CM

## 2022-12-14 DIAGNOSIS — F32.9 REACTIVE DEPRESSION: ICD-10-CM

## 2022-12-14 DIAGNOSIS — Z13.1 SCREENING FOR DIABETES MELLITUS (DM): ICD-10-CM

## 2022-12-14 RX ORDER — BUPROPION HYDROCHLORIDE 150 MG/1
150 TABLET ORAL EVERY MORNING
Qty: 30 TABLET | Refills: 5 | Status: SHIPPED | OUTPATIENT
Start: 2022-12-14

## 2022-12-14 NOTE — PATIENT INSTRUCTIONS

## 2022-12-14 NOTE — PROGRESS NOTES
ADULT ANNUAL [facility]    NAME: Liane Aviles  AGE: 30 y o  SEX: female  : 1985     DATE: 2022     Assessment and Plan:     Problem List Items Addressed This Visit        Other    Reactive depression     Trial wellbutrin  Pt will call in 2 weeks if effect is inadequate  Will consider increasing wellbutrin at that time or adding back lexapro  Relevant Medications    buPROPion (Wellbutrin XL) 150 mg 24 hr tablet    Annual physical exam - Primary     Normal physical exam   Screening labs completed in 2021 with a normal CMP, lipid profile, CBC and TSH  No family history of breast cancer colon cancer  Pap UTD  Other Visit Diagnoses     Screening for deficiency anemia        Screening for diabetes mellitus (DM)              Immunizations and preventive care screenings were discussed with patient today  Appropriate education was printed on patient's after visit summary  Counseling:  Alcohol/drug use: discussed moderation in alcohol intake, the recommendations for healthy alcohol use, and avoidance of illicit drug use  Dental Health: discussed importance of regular tooth brushing, flossing, and dental visits  Exercise: the importance of regular exercise/physical activity was discussed  Recommend exercise 3-5 times per week for at least 30 minutes  Return in 1 year (on 2023)  Chief Complaint:     Chief Complaint   Patient presents with   • Physical Exam      History of Present Illness:     Adult Annual Physical   Patient here for a comprehensive physical exam  The patient reports no problems  Diet and Physical Activity  Diet/Nutrition: well balanced diet  Exercise: moderate cardiovascular exercise  Depression Screening  PHQ-2/9 Depression Screening         General Health  Sleep: sleeps well  Hearing: no issues  Vision: no vision problems  Dental: regular dental visits  /GYN Health  Last menstrual period: monthly   Contraceptive method: none  History of STDs?: no      Review of Systems:     Review of Systems   Constitutional: Negative for fever and unexpected weight change  HENT: Negative for ear pain, sore throat and trouble swallowing  Eyes: Negative for pain and visual disturbance  Respiratory: Negative for cough, chest tightness, shortness of breath and wheezing  Cardiovascular: Negative for chest pain  Gastrointestinal: Negative for abdominal distention, abdominal pain, blood in stool, constipation, diarrhea, nausea and vomiting  Endocrine: Negative for polydipsia and polyuria  Genitourinary: Negative for dysuria and hematuria  Musculoskeletal: Negative for back pain and myalgias  Skin: Negative for rash  Neurological: Negative for syncope and headaches  Psychiatric/Behavioral: Negative for suicidal ideas        Past Medical History:     Past Medical History:   Diagnosis Date   • Miscarriage       Past Surgical History:     Past Surgical History:   Procedure Laterality Date   • WISDOM TOOTH EXTRACTION        Social History:     Social History     Socioeconomic History   • Marital status: /Civil Union     Spouse name: None   • Number of children: None   • Years of education: None   • Highest education level: None   Occupational History   • None   Tobacco Use   • Smoking status: Never   • Smokeless tobacco: Never   Vaping Use   • Vaping Use: Never used   Substance and Sexual Activity   • Alcohol use: Not Currently   • Drug use: Not Currently     Types: Marijuana   • Sexual activity: Yes     Partners: Male     Birth control/protection: Condom Male   Other Topics Concern   • None   Social History Narrative   • None     Social Determinants of Health     Financial Resource Strain: Not on file   Food Insecurity: Not on file   Transportation Needs: Not on file   Physical Activity: Not on file   Stress: Not on file   Social Connections: Not on file Intimate Partner Violence: Not on file   Housing Stability: Not on file      Family History:     Family History   Problem Relation Age of Onset   • Emphysema Maternal Grandfather 79   • Colon cancer Paternal Grandmother 80      Current Medications:     Current Outpatient Medications   Medication Sig Dispense Refill   • buPROPion (Wellbutrin XL) 150 mg 24 hr tablet Take 1 tablet (150 mg total) by mouth every morning 30 tablet 5   • Multiple Vitamin (multivitamin) tablet Take 1 tablet by mouth daily     • zinc gluconate 50 mg tablet Take 50 mg by mouth daily       No current facility-administered medications for this visit  Allergies:     No Known Allergies   Physical Exam:     /60 (BP Location: Left arm, Patient Position: Sitting, Cuff Size: Adult)   Pulse 75   Temp 99 1 °F (37 3 °C) (Tympanic)   Resp 16   Ht 5' 5" (1 651 m)   Wt 64 4 kg (142 lb)   SpO2 98%   BMI 23 63 kg/m²     Physical Exam  Constitutional:       Appearance: She is well-developed  HENT:      Head: Normocephalic and atraumatic  Eyes:      General: No scleral icterus  Conjunctiva/sclera: Conjunctivae normal       Pupils: Pupils are equal, round, and reactive to light  Cardiovascular:      Rate and Rhythm: Normal rate and regular rhythm  Heart sounds: Normal heart sounds  No murmur heard  No friction rub  No gallop  Pulmonary:      Effort: Pulmonary effort is normal  No respiratory distress  Breath sounds: No wheezing or rales  Chest:      Chest wall: No tenderness  Abdominal:      General: Bowel sounds are normal  There is no distension  Palpations: Abdomen is soft  There is no mass  Tenderness: There is no abdominal tenderness  Musculoskeletal:         General: Normal range of motion  Cervical back: Normal range of motion and neck supple  Lymphadenopathy:      Cervical: No cervical adenopathy  Skin:     General: Skin is warm and dry        Capillary Refill: Capillary refill takes less than 2 seconds  Findings: No rash  Neurological:      Mental Status: She is alert and oriented to person, place, and time  Cranial Nerves: No cranial nerve deficit            Henry Tovar MD   [address] [place] [address]

## 2022-12-14 NOTE — PROGRESS NOTES
Assessment/Plan:    Annual physical exam  Normal physical exam   Screening labs completed in November 2021 with a normal CMP, lipid profile, CBC and TSH  No family history of breast cancer colon cancer  Pap UTD  Reactive depression  Trial wellbutrin  Pt will call in 2 weeks if effect is inadequate  Will consider increasing wellbutrin at that time or adding back lexapro  Diagnoses and all orders for this visit:    Annual physical exam    Reactive depression  -     buPROPion (Wellbutrin XL) 150 mg 24 hr tablet; Take 1 tablet (150 mg total) by mouth every morning    Screening for deficiency anemia  -     Cancel: Basic metabolic panel; Future    Screening for diabetes mellitus (DM)  -     Cancel: CBC and Platelet; Future        Subjective: depression     Patient ID: Tiffanie Navas is a 30 y o  female  HPI  Pt would like to restart lexapro  She only noticed minimal improvement  The following portions of the patient's history were reviewed and updated as appropriate: allergies, current medications, past family history, past medical history, past social history, past surgical history and problem list     Review of Systems   Constitutional: Negative for fever and unexpected weight change  HENT: Negative for ear pain, sore throat and trouble swallowing  Eyes: Negative for pain and visual disturbance  Respiratory: Negative for cough, chest tightness, shortness of breath and wheezing  Cardiovascular: Negative for chest pain  Gastrointestinal: Negative for abdominal distention, abdominal pain, blood in stool, constipation, diarrhea, nausea and vomiting  Endocrine: Negative for polydipsia and polyuria  Genitourinary: Negative for dysuria and hematuria  Musculoskeletal: Negative for back pain and myalgias  Skin: Negative for rash  Neurological: Negative for syncope and headaches  Psychiatric/Behavioral: Negative for suicidal ideas           PHQ-2/9 Depression Screening [unfilled]    Objective:      /60 (BP Location: Left arm, Patient Position: Sitting, Cuff Size: Adult)   Pulse 75   Temp 99 1 °F (37 3 °C) (Tympanic)   Resp 16   Ht 5' 5" (1 651 m)   Wt 64 4 kg (142 lb)   SpO2 98%   BMI 23 63 kg/m²          Physical Exam  Constitutional:       Appearance: She is well-developed  HENT:      Head: Normocephalic and atraumatic  Right Ear: External ear normal       Left Ear: External ear normal       Mouth/Throat:      Pharynx: No oropharyngeal exudate  Eyes:      General: No scleral icterus  Conjunctiva/sclera: Conjunctivae normal       Pupils: Pupils are equal, round, and reactive to light  Cardiovascular:      Rate and Rhythm: Normal rate and regular rhythm  Heart sounds: No murmur heard  No friction rub  No gallop  Pulmonary:      Effort: Pulmonary effort is normal  No respiratory distress  Breath sounds: Normal breath sounds  No wheezing or rales  Abdominal:      General: Bowel sounds are normal  There is no distension  Palpations: Abdomen is soft  There is no mass  Tenderness: There is no abdominal tenderness  There is no rebound  Musculoskeletal:         General: Normal range of motion  Cervical back: Normal range of motion and neck supple  Skin:     General: Skin is warm and dry  Neurological:      Mental Status: She is alert and oriented to person, place, and time

## 2022-12-14 NOTE — ASSESSMENT & PLAN NOTE
Trial wellbutrin  Pt will call in 2 weeks if effect is inadequate  Will consider increasing wellbutrin at that time or adding back lexapro
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
## 2023-01-12 DIAGNOSIS — F32.9 REACTIVE DEPRESSION: ICD-10-CM

## 2023-01-12 RX ORDER — BUPROPION HYDROCHLORIDE 300 MG/1
300 TABLET ORAL EVERY MORNING
Qty: 30 TABLET | Refills: 2 | Status: SHIPPED | OUTPATIENT
Start: 2023-01-12 | End: 2023-03-27 | Stop reason: ALTCHOICE

## 2023-02-10 ENCOUNTER — SOCIAL WORK (OUTPATIENT)
Dept: BEHAVIORAL/MENTAL HEALTH CLINIC | Facility: CLINIC | Age: 38
End: 2023-02-10

## 2023-02-10 DIAGNOSIS — F33.1 MODERATE EPISODE OF RECURRENT MAJOR DEPRESSIVE DISORDER (HCC): Primary | ICD-10-CM
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
## 2023-03-09 ENCOUNTER — SOCIAL WORK (OUTPATIENT)
Dept: BEHAVIORAL/MENTAL HEALTH CLINIC | Facility: CLINIC | Age: 38
End: 2023-03-09

## 2023-03-09 DIAGNOSIS — F33.1 MODERATE EPISODE OF RECURRENT MAJOR DEPRESSIVE DISORDER (HCC): Primary | ICD-10-CM

## 2023-03-09 NOTE — PSYCH
1  Moderate episode of recurrent major depressive disorder ([facility] [place] [address] )          Data: Pt struggling with increase in depression, with anxious disposition  Pt reports she is very stuck on feeling overwhelmed, angry and general shame and guilt over many aspects of her life  Job is not going well  Pt generally is unhappy and feeling stuck  Pt admits she has stopped taking good care of herself, and she is honing in on every negative event and every example of people and life being harsh and unsupportive  Pt and this writer went back to the importance of pt focusing on what she is in control of  Pt reports there are very few examples of this in her life with many aspects of her life feeling out of control  This writer suggests pt focus on her own health and physical body  Pt has stopped working out, and this is the single thing which impacts her mood the most and which pt feels the most benefit from in terms of feeling less depressed and less stuck  Pt reports she finds she does not have much time to work out, but this is a by product of difficult relationship with her  who is ADHD and struggles with doing things by himself and managing his own life  However, this leaves pt feeling very frustrated and angry that her  sets the boundaries for her life, and impacts her feeling of being in control of even small aspects of her life  This is probably not entirely true, but pt feels it is  This writer suggests pt has to have some non negotiable's in her life and allocate time for work out on at least 4 times a week  Pt needs to find ways to help  understand this, and work on relying on him in more areas- or finding time herself by getting up a little earlier- or finding outside sources of support  Assessment: Pt feels and looks stuck  More unkept, and tearful than any time she has been coming to therapy with this writer   Pt is struggling at work, and this is adding extra stress to her, and she is tired of working full time, and doing much of the heavy lifting with her daughter  Pt is encouraged to find ways to take back some control, and work on herself during this period of her life  She has a vacation planned for end of April, and this seems like it is coming at a good time  Plan: Pt is encouraged to follow up with this writer in 4 weeks time  Psychotherapy Provided: Individual Psychotherapy 45 minutes     Length of time in session: 45 minutes, follow up in 4 weeks time  Goals addressed in session: Goal 1     Pain:      none    0    Current suicide risk : Low     Pt is future oriented and not suicidal        Behavioral Health Treatment Plan [facility]: Diagnosis and Treatment Plan explained to Darius Hewitt relates understanding diagnosis and is agreeable to Treatment Plan   Yes     Visit start and stop times:    03/09/23  Start Time: 1203  Stop Time: 1248  Total Visit Time: 45 minutes

## 2023-03-27 DIAGNOSIS — F33.9 DEPRESSION, RECURRENT (HCC): Primary | ICD-10-CM

## 2023-03-27 RX ORDER — ESCITALOPRAM OXALATE 20 MG/1
20 TABLET ORAL DAILY
Qty: 90 TABLET | Refills: 1 | Status: SHIPPED | OUTPATIENT
Start: 2023-03-27

## 2023-04-06 ENCOUNTER — TELEPHONE (OUTPATIENT)
Dept: FAMILY MEDICINE CLINIC | Facility: CLINIC | Age: 38
End: 2023-04-06

## 2023-04-06 NOTE — TELEPHONE ENCOUNTER
Patient called, she has an appt scheduled for today and she was wondering if this appt could be changed to a virtual  Patient says she has a lot going on and will not be able to come in to the office

## 2023-05-05 ENCOUNTER — SOCIAL WORK (OUTPATIENT)
Dept: BEHAVIORAL/MENTAL HEALTH CLINIC | Facility: CLINIC | Age: 38
End: 2023-05-05

## 2023-05-05 DIAGNOSIS — F33.1 MODERATE EPISODE OF RECURRENT MAJOR DEPRESSIVE DISORDER (HCC): Primary | ICD-10-CM

## 2023-05-05 NOTE — PSYCH
1  Moderate episode of recurrent major depressive disorder ([facility] [address] )          Data: pt and writer reconnect after several weeks  Pt reports she is doing ok  Pt reports she and her  had a great vacation a few weeks ago  Pt reports the intimacy was there, and they were on the same page regarding childcare and discipline, and pt reports it felt good to be on the same page and working together  Since getting back, things have decompensated  Much of this is related to pt feeling like she is the bad  and her  is the good    lets their daughter watch you tube videos endlessly  This has resulted in jamison having behavioral issues with pt when she is asked to do simple things  Pt reports this is all related to poor parenting, and pt is very frustrated by this  Pt and this writer discussed the importance of pt and  being on the same page, and pt feels much of her husbands concerns step from him having ADHD and low self esteem  Pt is still trying to help her  get the right kind of help and treatment to manage his symptoms  Howoever, all this leaves pt feeling burnt out, frustrated and angry at times  Pt feels she is putting 80% of the work in, and not getting much back in return  Pt has the insight to recognize the other good time in her relationship was while her husbands father was in the hospital and she was offering care and support to her  while he was struggling with the impending death of his father  Pt notes she was slipping back into care taker role, which helps her feel wanted and needed and pt admits she is good at this  What she is not good at is constantly taking care of her , who does not want to help himself  Pt also admits she sounds like a broken record when it comes to this  Pt is encouraged to identify boundaries, and things she absolutely has to see in order to stay in the relationship   Pt is also encouraged to not allow her own self care needs to be boxed out by her husbands unwillingness to do  or do tasks which are uninteresting to him  Pt reports in order to feel highter levels of self esteem she wants to do some volunteer work, work at [employer], donate blood  But all these things bring about a negative response from her  who is upset he has to do more at home, while she is away doing these things  Pt might be able to solve some of these problems outside of her  by dropping off her daughter at [facility], but pt is reluctant to do this, as she feels her  should be stepping up more  Assessment: Pt and  would probably benefit from individual therapy as well as couples therapy  Pt is tending to get stuck over the same areas, and she is frustrated by what she can't fix  Pt is encouraged to come up with solutions where possible, and to absolutely ensure she is getting time for self care, exercise, and those things which lift her spirits and work as protective factors against depression  Plan: Follow up in 2 weeks time  Psychotherapy Provided: Individual Psychotherapy 45 minutes     Length of time in session: 45 minutes, follow up in 2 weeks time  Goals addressed in session: Goal 1     Pain:      none    0    Current suicide risk : Low     Pt is future oriented and not suicidal        Behavioral Health Treatment Plan [facility]: Diagnosis and Treatment Plan explained to Tim Current relates understanding diagnosis and is agreeable to Treatment Plan   Yes     Visit start and stop times:    05/05/23  Start Time: 3788  Stop Time: 1249  Total Visit Time: 45 minutes

## 2023-05-19 ENCOUNTER — SOCIAL WORK (OUTPATIENT)
Dept: BEHAVIORAL/MENTAL HEALTH CLINIC | Facility: CLINIC | Age: 38
End: 2023-05-19

## 2023-05-19 DIAGNOSIS — F33.0 MILD EPISODE OF RECURRENT MAJOR DEPRESSIVE DISORDER (HCC): Primary | ICD-10-CM

## 2023-05-19 NOTE — PSYCH
1  Mild episode of recurrent major depressive disorder ([facility] [address] )          Data: Pt is tearful during today's session  Pt feels overwhelmed at work  Feels overwhelmed at home  Pt admits she needs to feel like she is accomplishing things in order to feel better about herself  Pt reports she is not happy at work, does not feel supported (even though her colleagues are nice)  The general theme in pt's life is struggling to get ahead  Pt also reports more financial pressures as cars age, and cost of living gets higher  Pt and writer discussed how to best use her time, and how to change her environment to try and increase success  Pt may need to switch jobs into a job environment which is more predictable and clearer chain of command and job responsibilities  Pt is also still struggling in her marriage   likely has ADHD  This writer suggests that if pt demands one thing, it is that her  gets help he needs  Pt is even encouraged to drive him to an appointment, even if it takes up some of her time  This is time well spent  Pt is still struggling with feeling unaccomplished at times  This writer suggests the many areas where she feels a lack of order and control are mirrored in her own struggles for self worth and meaning  This is why this writer suggests pt try and do as many meaningful things as possible, including her daughter and  if they want to tag along  Pt is encouraged to make things happen, build things, clean things, go the the park, exercise and use up all her spare time to do productive meaningful things  Assessment: Pt looks a little defeated today, and is struggling to feel hopeful about her current job, marriage and home  Seems like a lot of things are on the edge  Pt is encouraged to continue to work through these things, but really focus on her self care and be productive as much as possible by finding things she can control, work on and improve       Plan: Follow up in 3 weeks time        Psychotherapy Provided: Individual Psychotherapy 45 minutes     Length of time in session: 45 minutes, follow up in 3 weeks time  Goals addressed in session: Goal 1     Pain:      none    0    Current suicide risk : Low     Pt is future oriented and not suicidal        Behavioral Health Treatment Plan [facility]: Diagnosis and Treatment Plan explained to [place] relates understanding diagnosis and is agreeable to Treatment Plan   Yes     Visit start and stop times:    05/19/23  Start Time: 1202  Stop Time: 1247  Total Visit Time: 45 minutes

## 2023-09-11 ENCOUNTER — TELEPHONE (OUTPATIENT)
Dept: PSYCHIATRY | Facility: CLINIC | Age: 38
End: 2023-09-11

## 2023-09-11 NOTE — LETTER
Dear Ana Maria Velasquez : We are contacting you because your name is currently included on the [address] [place] [address] wait-list for Talk Therapy and/or Medication Management. (Please [place] which services are needed)     In our efforts to provide the highest quality care, [facility] has begun the process of upgrading our behavioral health systems to increase efficiency and expedite delivery of services. As part of this process, we ask you to please confirm your continued interest in the services above. If you are no longer interested or in need, please nikolas “No” in the area below. If you are still interested and in need, please nikolas “Yes” and provide your most current demographic and insurance information within 15 days. If we do not receive confirmation from you by 2023 your information will not be included in the system upgrade and your place on the waitlist will be lost.     Thank you in advance for your patience and understanding and we apologize for any inconvenience this may cause. Patient Name and :    Still in need of services: Yes or No     Current Address:     Phone#:     Best time to receive a call: Insurance Carrier:      Policy/ID#: Group#: Insurance Services Phone#:      What is your current presenting problem? Open to virtual talk therapy: Yes or No      We will call you to do an Intake when an appointment becomes available. You can send this information back to us in any of the ways below:    Email: [email or web address] Sukhi Reagan  Fax#:  [phone]  Mail:   [address] [place] [address], [address] [place] [address]

## 2023-09-11 NOTE — TELEPHONE ENCOUNTER
Sent Wait List Letter VIA [employer] . Verify patients need of services from wait list after 15 days.  If no communication remove from NON-REFERRAL wait list.

## 2023-10-01 DIAGNOSIS — F33.9 DEPRESSION, RECURRENT (HCC): ICD-10-CM

## 2023-10-02 RX ORDER — ESCITALOPRAM OXALATE 20 MG/1
20 TABLET ORAL DAILY
Qty: 90 TABLET | Refills: 1 | Status: SHIPPED | OUTPATIENT
Start: 2023-10-02

## 2023-10-12 ENCOUNTER — OFFICE VISIT (OUTPATIENT)
Dept: FAMILY MEDICINE CLINIC | Facility: CLINIC | Age: 38
End: 2023-10-12
Payer: COMMERCIAL

## 2023-10-12 VITALS
HEIGHT: 65 IN | DIASTOLIC BLOOD PRESSURE: 78 MMHG | TEMPERATURE: 98.7 F | HEART RATE: 93 BPM | OXYGEN SATURATION: 98 % | BODY MASS INDEX: 23.57 KG/M2 | RESPIRATION RATE: 18 BRPM | SYSTOLIC BLOOD PRESSURE: 126 MMHG | WEIGHT: 141.5 LBS

## 2023-10-12 DIAGNOSIS — R53.83 OTHER FATIGUE: ICD-10-CM

## 2023-10-12 DIAGNOSIS — E55.9 VITAMIN D DEFICIENCY: ICD-10-CM

## 2023-10-12 DIAGNOSIS — F33.1 MODERATE EPISODE OF RECURRENT MAJOR DEPRESSIVE DISORDER (HCC): Primary | ICD-10-CM

## 2023-10-12 PROCEDURE — 99215 OFFICE O/P EST HI 40 MIN: CPT | Performed by: FAMILY MEDICINE

## 2023-10-12 RX ORDER — FLUOXETINE 10 MG/1
10 CAPSULE ORAL DAILY
Qty: 30 CAPSULE | Refills: 0 | Status: SHIPPED | OUTPATIENT
Start: 2023-10-12 | End: 2023-11-11

## 2023-10-12 NOTE — PROGRESS NOTES
Name: Cain Saha      : 1985      MRN: [id number]  Encounter Provider: Irma Garcia MD  Encounter Date: 10/12/2023   Encounter department: [address] [place] [address]     1. Moderate episode of recurrent major depressive disorder ([address] )  -     Ambulatory referral to [employer]; Future  -     FLUoxetine (PROzac) 10 mg capsule; Take 1 capsule (10 mg total) by mouth daily    2. Vitamin D deficiency  -     Vitamin D 25 hydroxy; Future    3. Other fatigue  -     CBC and Platelet; Future  -     TSH, 3rd generation with Free T4 reflex; Future  -     Basic metabolic panel; Future    Obtain lab work listed above. Referral made to psychiatry. Also provided patient with information regarding psychology today. Lexapro caused her to have low energy in the past.  We will trial her on Prozac 10 mg. Will likely need to increase to 20 mg. We will follow back up in 4 weeks. Extensive time spent counseling patient seen below. I have spent a total time of 50 minutes on 10/12/23 in caring for this patient including Diagnostic results, Prognosis, Risks and benefits of tx options, Instructions for management, Patient and family education, Importance of tx compliance, Risk factor reductions, Impressions, Counseling / Coordination of care, Documenting in the medical record, Reviewing / ordering tests, medicine, procedures  , and Obtaining or reviewing history  . Subjective      Patient presents with:  Medication Problem: Patient is very emotional , she was on anti depressant medications and would like to be put back on it. She also needs some support for mental health. Started After she gave birth. Has felt sad since 2019. Never diagnosed with post partum. Marriage problems  diagnosed with ADHD. Counselor kaylee now seeing  she needs a new therapist.   Starting a new job. Wants to stop ups and downs. Doesn't have a single kady. She works full-time.  is a good dad. Never has a second to relax. Review of Systems   Constitutional:  Positive for fatigue. Negative for fever. HENT:  Negative for sore throat. Eyes:  Negative for visual disturbance. Respiratory:  Negative for cough, chest tightness and shortness of breath. Cardiovascular:  Negative for chest pain, palpitations and leg swelling. Gastrointestinal:  Negative for abdominal pain, constipation, diarrhea and nausea. Endocrine: Negative for cold intolerance and heat intolerance. Genitourinary:  Negative for flank pain. Musculoskeletal:  Negative for back pain and neck pain. Skin:  Negative for rash. Neurological:  Negative for headaches. Psychiatric/Behavioral:  Positive for dysphoric mood. Negative for behavioral problems, confusion, sleep disturbance and suicidal ideas. Current Outpatient Medications on File Prior to Visit   Medication Sig   • Multiple Vitamin (multivitamin) tablet Take 1 tablet by mouth daily   • [DISCONTINUED] zinc gluconate 50 mg tablet Take 50 mg by mouth daily   • [DISCONTINUED] escitalopram (LEXAPRO) 20 mg tablet take 1 tablet by mouth once daily       Objective     /78 (BP Location: Left arm, Patient Position: Sitting, Cuff Size: Standard)   Pulse 93   Temp 98.7 °F (37.1 °C) (Tympanic)   Resp 18   Ht 5' 5" (1.651 m)   Wt 64.2 kg (141 lb 8 oz)   LMP 09/28/2023 (Exact Date)   SpO2 98%   BMI 23.55 kg/m²     Physical Exam  Vitals and nursing note reviewed. Constitutional:       Appearance: She is well-developed. HENT:      Head: Normocephalic and atraumatic. Cardiovascular:      Rate and Rhythm: Normal rate and regular rhythm. Pulmonary:      Effort: Pulmonary effort is normal.      Breath sounds: Normal breath sounds. Neurological:      General: No focal deficit present. Mental Status: She is alert. Psychiatric:         Mood and Affect: Mood is depressed. Affect is tearful. Thought Content:  Thought content does not include suicidal ideation. Thought content does not include homicidal or suicidal plan.        Michell Infante MD

## 2023-10-16 ENCOUNTER — APPOINTMENT (OUTPATIENT)
Dept: LAB | Facility: MEDICAL CENTER | Age: 38
End: 2023-10-16
Payer: COMMERCIAL

## 2023-10-16 DIAGNOSIS — R53.83 OTHER FATIGUE: ICD-10-CM

## 2023-10-16 DIAGNOSIS — E55.9 VITAMIN D DEFICIENCY: ICD-10-CM

## 2023-10-16 LAB
25(OH)D3 SERPL-MCNC: 24.8 NG/ML (ref 30–100)
ANION GAP SERPL CALCULATED.3IONS-SCNC: 7 MMOL/L
BUN SERPL-MCNC: 7 MG/DL (ref 5–25)
CALCIUM SERPL-MCNC: 10 MG/DL (ref 8.4–10.2)
CHLORIDE SERPL-SCNC: 104 MMOL/L (ref 96–108)
CO2 SERPL-SCNC: 28 MMOL/L (ref 21–32)
CREAT SERPL-MCNC: 0.68 MG/DL (ref 0.6–1.3)
ERYTHROCYTE [DISTWIDTH] IN BLOOD BY AUTOMATED COUNT: 12.6 % (ref 11.6–15.1)
GFR SERPL CREATININE-BSD FRML MDRD: 112 ML/MIN/1.73SQ M
GLUCOSE P FAST SERPL-MCNC: 81 MG/DL (ref 65–99)
HCT VFR BLD AUTO: 41.2 % (ref 34.8–46.1)
HGB BLD-MCNC: 13.4 G/DL (ref 11.5–15.4)
MCH RBC QN AUTO: 30.7 PG (ref 26.8–34.3)
MCHC RBC AUTO-ENTMCNC: 32.5 G/DL (ref 31.4–37.4)
MCV RBC AUTO: 95 FL (ref 82–98)
PLATELET # BLD AUTO: 240 THOUSANDS/UL (ref 149–390)
PMV BLD AUTO: 11.5 FL (ref 8.9–12.7)
POTASSIUM SERPL-SCNC: 4.2 MMOL/L (ref 3.5–5.3)
RBC # BLD AUTO: 4.36 MILLION/UL (ref 3.81–5.12)
SODIUM SERPL-SCNC: 139 MMOL/L (ref 135–147)
TSH SERPL DL<=0.05 MIU/L-ACNC: 2.02 UIU/ML (ref 0.45–4.5)
WBC # BLD AUTO: 6 THOUSAND/UL (ref 4.31–10.16)

## 2023-10-16 PROCEDURE — 82306 VITAMIN D 25 HYDROXY: CPT

## 2023-10-16 PROCEDURE — 85027 COMPLETE CBC AUTOMATED: CPT

## 2023-10-16 PROCEDURE — 80048 BASIC METABOLIC PNL TOTAL CA: CPT

## 2023-10-16 PROCEDURE — 36415 COLL VENOUS BLD VENIPUNCTURE: CPT

## 2023-10-16 PROCEDURE — 84443 ASSAY THYROID STIM HORMONE: CPT

## 2023-11-02 ENCOUNTER — ANNUAL EXAM (OUTPATIENT)
Dept: OBGYN CLINIC | Facility: CLINIC | Age: 38
End: 2023-11-02

## 2023-11-02 ENCOUNTER — TELEPHONE (OUTPATIENT)
Dept: PSYCHIATRY | Facility: CLINIC | Age: 38
End: 2023-11-02

## 2023-11-02 VITALS
WEIGHT: 144 LBS | BODY MASS INDEX: 23.99 KG/M2 | HEIGHT: 65 IN | DIASTOLIC BLOOD PRESSURE: 66 MMHG | SYSTOLIC BLOOD PRESSURE: 112 MMHG

## 2023-11-02 DIAGNOSIS — Z01.419 WOMEN'S ANNUAL ROUTINE GYNECOLOGICAL EXAMINATION: Primary | ICD-10-CM

## 2023-11-02 NOTE — PROGRESS NOTES
Subjective    HPI:     Anny Higginbotham is a 41 y.o. female. In a stable relationship for 15 years. She is a [place], with a . Her menstrual cycles are regular with occasional intramenstrual bleeding. Her current method of contraception includes condoms. She denies issues with intimacy. She denies /GI and Gyn complaints. She feels safe at home. She  is working on getting her  depression stable. She was started on Prozac 3 weeks ago. Medical, surgical and family history reviewed. Her dental care is up-to-date. She eats a healthy diet. She is not exercising. Visit Vitals  /66 (BP Location: Left arm, Patient Position: Sitting, Cuff Size: Standard)   Ht 5' 5" (1.651 m)   Wt 65.3 kg (144 lb)   LMP 10/18/2023 (Exact Date)   BMI 23.96 kg/m²   OB Status Having periods   Smoking Status Never   BSA 1.72 m²       Gynecologic History    Patient's last menstrual period was 2023 (exact date). Last Pap: 21. Results were: normal    Obstetric and Medical History    OB History    Para Term  AB Living   2 1 1   1 1   SAB IAB Ectopic Multiple Live Births   1       1      # Outcome Date GA Lbr Morales/2nd Weight Sex Delivery Anes PTL Lv   2 Term 01/10/19 41w0d  3884 g (8 lb 9 oz) F Vag-Spont  Y NADIRA   1 SAB                Past Medical History:   Diagnosis Date    Depression 10/18/2021    Miscarriage        Past Surgical History:   Procedure Laterality Date    WISDOM TOOTH EXTRACTION         The following portions of the patient's history were reviewed and updated as appropriate: allergies, current medications, past family history, past medical history, past social history, past surgical history, and problem list.    Review of Systems    Pertinent items are noted in HPI. Objective    Physical Exam  Constitutional:       Appearance: Normal appearance. She is well-developed. Genitourinary:      Vulva, bladder and urethral meatus normal.      No lesions in the vagina.       Right Labia: No rash, tenderness, lesions, skin changes or Bartholin's cyst.     Left Labia: No tenderness, lesions, skin changes, Bartholin's cyst or rash. No labial fusion noted. No inguinal adenopathy present in the right or left side. No vaginal discharge, erythema, tenderness, bleeding or granulation tissue. No vaginal prolapse present. No vaginal atrophy present. Right Adnexa: not tender, not full and no mass present. Left Adnexa: not tender, not full and no mass present. Cervix is parous. No cervical motion tenderness, discharge, friability, lesion, polyp or nabothian cyst.      Uterus is not enlarged, tender, irregular or prolapsed. No uterine mass detected. Uterus is anteverted. Pelvic exam was performed with patient in the lithotomy position. Breasts:     Breasts are symmetrical.      Right: No inverted nipple, mass, nipple discharge, skin change or tenderness. Left: No inverted nipple, mass, nipple discharge, skin change or tenderness. HENT:      Head: Normocephalic and atraumatic. Neck:      Thyroid: No thyromegaly. Cardiovascular:      Rate and Rhythm: Normal rate and regular rhythm. Heart sounds: Normal heart sounds, S1 normal and S2 normal.   Pulmonary:      Effort: Pulmonary effort is normal.      Breath sounds: Normal breath sounds. Abdominal:      General: Bowel sounds are normal. There is no distension. Palpations: Abdomen is soft. There is no mass. Tenderness: There is no abdominal tenderness. There is no guarding. Hernia: There is no hernia in the left inguinal area or right inguinal area. Musculoskeletal:      Cervical back: Neck supple. Lymphadenopathy:      Cervical: No cervical adenopathy. Upper Body:      Right upper body: No supraclavicular or axillary adenopathy. Left upper body: No supraclavicular or axillary adenopathy. Lower Body: No right inguinal adenopathy.  No left inguinal adenopathy. Neurological:      Mental Status: She is alert. Skin:     General: Skin is warm and dry. Findings: No rash. Psychiatric:         Attention and Perception: Attention and perception normal.         Mood and Affect: Mood and affect normal.         Speech: Speech normal.         Behavior: Behavior is cooperative. Thought Content: Thought content normal.         Cognition and Memory: Cognition and memory normal.         Judgment: Judgment normal.   Vitals and nursing note reviewed. Assessment and Plan    Diagnoses and all orders for this visit:    Women's annual routine gynecological examination      Patient informed of a Stable GYN exam. A pap smear was not performed due to a negative pap in 2021. I have discussed the importance of exercise and healthy diet as well as adequate intake of calcium and vitamin D. The current ASCCP guidelines were reviewed. The low risk patient will receive pap smear screening every 3 years until the age of 34 and then every 3 to 5 years with HPV co-testing from the ages of 32-69. I emphasized the importance of an annual pelvic and breast exam. A yearly mammogram is recommended for breast cancer screening starting at age 36. Results will be released to [facility], if abnormal will call to review and discuss treatment plan. All questions have been answered to her satisfaction. Contraception: condoms. Follow up in: 1 year and sooner if needed.

## 2023-11-06 ENCOUNTER — RA CDI HCC (OUTPATIENT)
Dept: OTHER | Facility: HOSPITAL | Age: 38
End: 2023-11-06

## 2023-11-07 DIAGNOSIS — F33.1 MODERATE EPISODE OF RECURRENT MAJOR DEPRESSIVE DISORDER (HCC): ICD-10-CM

## 2023-11-07 RX ORDER — FLUOXETINE 10 MG/1
10 CAPSULE ORAL DAILY
Qty: 30 CAPSULE | Refills: 0 | Status: SHIPPED | OUTPATIENT
Start: 2023-11-07 | End: 2023-11-13 | Stop reason: SDUPTHER

## 2023-11-13 ENCOUNTER — OFFICE VISIT (OUTPATIENT)
Dept: FAMILY MEDICINE CLINIC | Facility: CLINIC | Age: 38
End: 2023-11-13
Payer: COMMERCIAL

## 2023-11-13 VITALS
OXYGEN SATURATION: 99 % | HEIGHT: 65 IN | HEART RATE: 71 BPM | DIASTOLIC BLOOD PRESSURE: 78 MMHG | WEIGHT: 145.5 LBS | RESPIRATION RATE: 17 BRPM | BODY MASS INDEX: 24.24 KG/M2 | TEMPERATURE: 98.5 F | SYSTOLIC BLOOD PRESSURE: 122 MMHG

## 2023-11-13 DIAGNOSIS — F33.1 MODERATE EPISODE OF RECURRENT MAJOR DEPRESSIVE DISORDER (HCC): Primary | ICD-10-CM

## 2023-11-13 DIAGNOSIS — E55.9 VITAMIN D DEFICIENCY: ICD-10-CM

## 2023-11-13 PROCEDURE — 99214 OFFICE O/P EST MOD 30 MIN: CPT | Performed by: FAMILY MEDICINE

## 2023-11-13 RX ORDER — ERGOCALCIFEROL 1.25 MG/1
50000 CAPSULE ORAL WEEKLY
Qty: 12 CAPSULE | Refills: 0 | Status: SHIPPED | OUTPATIENT
Start: 2023-11-13 | End: 2024-01-30

## 2023-11-13 RX ORDER — FLUOXETINE HYDROCHLORIDE 20 MG/1
20 CAPSULE ORAL DAILY
Qty: 30 CAPSULE | Refills: 2 | Status: SHIPPED | OUTPATIENT
Start: 2023-11-13 | End: 2024-02-11

## 2023-11-13 NOTE — PROGRESS NOTES
Name: Octavia Estrada      : 1985      MRN: [id number]  Encounter Provider: Cyndi Bain MD  Encounter Date: 2023   Encounter department: [address] [place] [address]     1. Moderate episode of recurrent major depressive disorder (HCC)  -     FLUoxetine (PROzac) 20 mg capsule; Take 1 capsule (20 mg total) by mouth daily    2. Vitamin D deficiency  -     ergocalciferol (VITAMIN D2) 50,000 units; Take 1 capsule (50,000 Units total) by mouth once a week for 12 doses       No response to Prozac 10 mg. Increase Prozac to 20 mg daily. Continue to monitor symptoms. Patient will send us a Tradier message over the next 2 weeks to update us on response to medication. Can increase to 40 mg if needed. Can also consider adding Wellbutrin 150 mg in addition if needed. Patient will work on establishing with therapist.    Subjective      Patient presents with:  Depression: 1 mo follow up    Continues to suffer from depression. No response to 10 mg of Prozac. Tried reaching out to multiple therapists but is having trouble scheduling an appointment. Denies any thoughts of self-harm or suicide. She remains tearful in office today. Review of Systems   Constitutional:  Negative for fatigue and fever. HENT:  Negative for sore throat. Eyes:  Negative for visual disturbance. Respiratory:  Negative for cough, chest tightness and shortness of breath. Cardiovascular:  Negative for chest pain, palpitations and leg swelling. Gastrointestinal:  Negative for abdominal pain, constipation, diarrhea and nausea. Endocrine: Negative for cold intolerance and heat intolerance. Genitourinary:  Negative for flank pain. Musculoskeletal:  Negative for back pain and neck pain. Skin:  Negative for rash. Neurological:  Negative for headaches. Psychiatric/Behavioral:  Positive for dysphoric mood. Negative for behavioral problems, confusion, self-injury and suicidal ideas.  The patient is not nervous/anxious and is not hyperactive. Current Outpatient Medications on File Prior to Visit   Medication Sig   • Multiple Vitamin (multivitamin) tablet Take 1 tablet by mouth daily   • [DISCONTINUED] FLUoxetine (PROzac) 10 mg capsule take 1 capsule by mouth once daily       Objective     /78 (BP Location: Left arm, Patient Position: Sitting, Cuff Size: Standard)   Pulse 71   Temp 98.5 °F (36.9 °C) (Tympanic)   Resp 17   Ht 5' 5" (1.651 m)   Wt 66 kg (145 lb 8 oz)   LMP 10/18/2023 (Exact Date)   SpO2 99%   BMI 24.21 kg/m²     Physical Exam  Vitals and nursing note reviewed. Constitutional:       Appearance: She is well-developed. HENT:      Head: Normocephalic and atraumatic. Cardiovascular:      Rate and Rhythm: Normal rate and regular rhythm. Pulmonary:      Effort: Pulmonary effort is normal.      Breath sounds: Normal breath sounds. Neurological:      General: No focal deficit present. Mental Status: She is alert. Psychiatric:         Mood and Affect: Mood is depressed. Mood is not anxious. Affect is tearful.        Manjeet Rivers MD

## 2023-12-27 ENCOUNTER — OFFICE VISIT (OUTPATIENT)
Dept: FAMILY MEDICINE CLINIC | Facility: CLINIC | Age: 38
End: 2023-12-27
Payer: COMMERCIAL

## 2023-12-27 VITALS
DIASTOLIC BLOOD PRESSURE: 76 MMHG | TEMPERATURE: 97.4 F | RESPIRATION RATE: 16 BRPM | BODY MASS INDEX: 23.74 KG/M2 | SYSTOLIC BLOOD PRESSURE: 126 MMHG | HEART RATE: 76 BPM | OXYGEN SATURATION: 97 % | HEIGHT: 65 IN | WEIGHT: 142.5 LBS

## 2023-12-27 DIAGNOSIS — Z00.00 ANNUAL PHYSICAL EXAM: Primary | ICD-10-CM

## 2023-12-27 DIAGNOSIS — F33.1 MODERATE EPISODE OF RECURRENT MAJOR DEPRESSIVE DISORDER (HCC): ICD-10-CM

## 2023-12-27 PROCEDURE — 99395 PREV VISIT EST AGE 18-39: CPT | Performed by: FAMILY MEDICINE

## 2023-12-27 RX ORDER — FLUOXETINE HYDROCHLORIDE 20 MG/1
20 CAPSULE ORAL DAILY
Qty: 90 CAPSULE | Refills: 1 | Status: SHIPPED | OUTPATIENT
Start: 2023-12-27 | End: 2024-06-24

## 2023-12-27 NOTE — PROGRESS NOTES
ADULT ANNUAL PHYSICAL  [facility]    NAME: Corie Hylton  AGE: 38 y.o. SEX: female  : 1985     DATE: 2023     Assessment and Plan:     Problem List Items Addressed This Visit        Other    Moderate episode of recurrent major depressive disorder (HCC)    Relevant Medications    FLUoxetine (PROzac) 20 mg capsule   Other Visit Diagnoses     Annual physical exam    -  Primary      Continue Prozac and therapy.  Responded very well to 20 mg.  Annual physical completed today.    Immunizations and preventive care screenings were discussed with patient today. Appropriate education was printed on patient's after visit summary.    Counseling:  Alcohol/drug use: discussed moderation in alcohol intake, the recommendations for healthy alcohol use, and avoidance of illicit drug use.  Dental Health: discussed importance of regular tooth brushing, flossing, and dental visits.  Injury prevention: discussed safety/seat belts, safety helmets, smoke detectors, carbon dioxide detectors, and smoking near bedding or upholstery.  Sexual health: discussed sexually transmitted diseases, partner selection, use of condoms, avoidance of unintended pregnancy, and contraceptive alternatives.  Exercise: the importance of regular exercise/physical activity was discussed. Recommend exercise 3-5 times per week for at least 30 minutes.          Return in about 6 months (around 2024).     Chief Complaint:     Chief Complaint   Patient presents with   • Physical Exam      History of Present Illness:     Adult Annual Physical   Patient here for a comprehensive physical exam. The patient reports problems - depression. Improving with medication and therapy .    Diet and Physical Activity  Diet/Nutrition: well balanced diet.   Exercise: moderate cardiovascular exercise.      Depression Screening  PHQ-2/9 Depression Screening         General Health  Sleep: sleeps well and gets 7-8  hours of sleep on average.   Hearing: normal - bilateral.  Vision: no vision problems.   Dental: regular dental visits and brushes teeth twice daily.       /GYN Health  Follows with gynecology? yes   Last menstrual period: Regular periods   Contraceptive method:  None .  History of STDs?: no.        Review of Systems:     Review of Systems   Constitutional:  Negative for activity change, fatigue and fever.   Eyes:  Negative for visual disturbance.   Respiratory:  Negative for shortness of breath.    Cardiovascular:  Negative for chest pain.   Gastrointestinal:  Negative for abdominal pain, constipation, diarrhea and nausea.   Endocrine: Negative for cold intolerance and heat intolerance.   Musculoskeletal:  Negative for back pain.   Skin:  Negative for rash.   Neurological:  Negative for headaches.   Psychiatric/Behavioral:  Negative for confusion.       Past Medical History:     Past Medical History:   Diagnosis Date   • Depression 10/18/2021   • Miscarriage       Past Surgical History:     Past Surgical History:   Procedure Laterality Date   • WISDOM TOOTH EXTRACTION        Social History:     Social History     Socioeconomic History   • Marital status: /Civil Union     Spouse name: None   • Number of children: None   • Years of education: None   • Highest education level: None   Occupational History   • None   Tobacco Use   • Smoking status: Never   • Smokeless tobacco: Never   Vaping Use   • Vaping status: Never Used   Substance and Sexual Activity   • Alcohol use: Yes     Alcohol/week: 7.0 standard drinks of alcohol     Types: 7 Glasses of wine per week     Comment: social   • Drug use: Not Currently     Types: Marijuana   • Sexual activity: Yes     Partners: Male     Birth control/protection: Condom Male   Other Topics Concern   • None   Social History Narrative   • None     Social Determinants of Health     Financial Resource Strain: Not on file   Food Insecurity: Not on file   Transportation Needs:  "Not on file   Physical Activity: Not on file   Stress: Not on file   Social Connections: Not on file   Intimate Partner Violence: Not on file   Housing Stability: Not on file      Family History:     Family History   Problem Relation Age of Onset   • Alcohol abuse Mother    • Alcohol abuse Father    • Depression Father    • Miscarriages / Stillbirths Sister    • Emphysema Maternal Grandfather 67   • Colon cancer Paternal Grandmother 91   • Miscarriages / Stillbirths Paternal Grandmother    • Colon cancer Paternal Grandfather       Current Medications:     Current Outpatient Medications   Medication Sig Dispense Refill   • ergocalciferol (VITAMIN D2) 50,000 units Take 1 capsule (50,000 Units total) by mouth once a week for 12 doses 12 capsule 0   • FLUoxetine (PROzac) 20 mg capsule Take 1 capsule (20 mg total) by mouth daily 90 capsule 1   • Multiple Vitamin (multivitamin) tablet Take 1 tablet by mouth daily       No current facility-administered medications for this visit.      Allergies:     No Known Allergies   Physical Exam:     /76 (BP Location: Left arm, Patient Position: Sitting, Cuff Size: Standard)   Pulse 76   Temp (!) 97.4 °F (36.3 °C)   Resp 16   Ht 5' 5\" (1.651 m)   Wt 64.6 kg (142 lb 8 oz)   SpO2 97%   BMI 23.71 kg/m²     Physical Exam  Vitals and nursing note reviewed.   Constitutional:       Appearance: Normal appearance. She is well-developed.   HENT:      Head: Normocephalic and atraumatic.   Eyes:      Extraocular Movements: Extraocular movements intact.      Pupils: Pupils are equal, round, and reactive to light.   Cardiovascular:      Rate and Rhythm: Normal rate and regular rhythm.   Pulmonary:      Effort: Pulmonary effort is normal.      Breath sounds: Normal breath sounds.   Abdominal:      General: Bowel sounds are normal.      Palpations: Abdomen is soft.   Musculoskeletal:      Cervical back: Normal range of motion.   Skin:     General: Skin is warm and dry.   Neurological:    "   General: No focal deficit present.      Mental Status: She is alert and oriented to person, place, and time.   Psychiatric:         Mood and Affect: Mood normal.         Speech: Speech normal.         Behavior: Behavior normal.          Samy Valenzuela MD   [facility]

## 2024-02-21 DIAGNOSIS — F33.1 MODERATE EPISODE OF RECURRENT MAJOR DEPRESSIVE DISORDER (HCC): ICD-10-CM

## 2024-02-21 RX ORDER — FLUOXETINE HYDROCHLORIDE 20 MG/1
20 CAPSULE ORAL DAILY
Qty: 90 CAPSULE | Refills: 0 | Status: SHIPPED | OUTPATIENT
Start: 2024-02-21 | End: 2024-08-19

## 2024-05-01 ENCOUNTER — OFFICE VISIT (OUTPATIENT)
Dept: FAMILY MEDICINE CLINIC | Facility: CLINIC | Age: 39
End: 2024-05-01
Payer: COMMERCIAL

## 2024-05-01 VITALS
RESPIRATION RATE: 18 BRPM | DIASTOLIC BLOOD PRESSURE: 70 MMHG | SYSTOLIC BLOOD PRESSURE: 124 MMHG | WEIGHT: 149 LBS | OXYGEN SATURATION: 98 % | BODY MASS INDEX: 24.83 KG/M2 | HEART RATE: 79 BPM | TEMPERATURE: 97.8 F | HEIGHT: 65 IN

## 2024-05-01 DIAGNOSIS — J30.2 SEASONAL ALLERGIES: ICD-10-CM

## 2024-05-01 DIAGNOSIS — F33.1 MODERATE EPISODE OF RECURRENT MAJOR DEPRESSIVE DISORDER (HCC): Primary | ICD-10-CM

## 2024-05-01 DIAGNOSIS — Z23 ENCOUNTER FOR IMMUNIZATION: ICD-10-CM

## 2024-05-01 PROCEDURE — 90471 IMMUNIZATION ADMIN: CPT | Performed by: FAMILY MEDICINE

## 2024-05-01 PROCEDURE — 99213 OFFICE O/P EST LOW 20 MIN: CPT | Performed by: FAMILY MEDICINE

## 2024-05-01 PROCEDURE — 3725F SCREEN DEPRESSION PERFORMED: CPT | Performed by: FAMILY MEDICINE

## 2024-05-01 PROCEDURE — 90715 TDAP VACCINE 7 YRS/> IM: CPT | Performed by: FAMILY MEDICINE

## 2024-05-01 RX ORDER — FLUOXETINE HYDROCHLORIDE 20 MG/1
20 CAPSULE ORAL DAILY
Qty: 90 CAPSULE | Refills: 3 | Status: SHIPPED | OUTPATIENT
Start: 2024-05-01 | End: 2025-05-01

## 2024-05-01 NOTE — PROGRESS NOTES
Name: oCrie Hylton      : 1985      MRN: [id number]  Encounter Provider: Samy Valenzuela MD  Encounter Date: 2024   Encounter department: [facility]    Assessment & Plan     1. Moderate episode of recurrent major depressive disorder (HCC)  Assessment & Plan:  Continues to follow with therapist.  Prozac 20 mg daily.  Significant improvement.  Continue current medications    Orders:  -     FLUoxetine (PROzac) 20 mg capsule; Take 1 capsule (20 mg total) by mouth daily    2. Seasonal allergies  Assessment & Plan:  Improved since switching to Zyrtec.  Continue to use as needed.      3. Encounter for immunization  -     TDAP VACCINE GREATER THAN OR EQUAL TO 6YO IM           Subjective      Patient presents with:  Follow-up: Follow up Depression Medication   Allergies: Had episodes (on & off)  of eyes watering, nose running, throat felt tight, on OTC Muccinex Syrup, Zyrtec, & Bendryl at night if needed x 2 weeks          Review of Systems   Constitutional:  Negative for activity change, fatigue and fever.   HENT:  Positive for congestion and rhinorrhea.    Eyes:  Negative for visual disturbance.   Respiratory:  Negative for shortness of breath.    Cardiovascular:  Negative for chest pain.   Gastrointestinal:  Negative for abdominal pain, constipation, diarrhea and nausea.   Endocrine: Negative for cold intolerance and heat intolerance.   Musculoskeletal:  Negative for back pain.   Skin:  Negative for rash.   Neurological:  Negative for headaches.   Psychiatric/Behavioral:  Negative for confusion.        Current Outpatient Medications on File Prior to Visit   Medication Sig   • Multiple Vitamin (multivitamin) tablet Take 1 tablet by mouth daily   • [DISCONTINUED] FLUoxetine (PROzac) 20 mg capsule Take 1 capsule (20 mg total) by mouth daily   • ergocalciferol (VITAMIN D2) 50,000 units Take 1 capsule (50,000 Units total) by mouth once a week for 12 doses       Objective     /70 (BP  "Location: Left arm, Patient Position: Sitting, Cuff Size: Standard)   Pulse 79   Temp 97.8 °F (36.6 °C)   Resp 18   Ht 5' 5\" (1.651 m)   Wt 67.6 kg (149 lb)   SpO2 98%   BMI 24.79 kg/m²     Physical Exam  Vitals and nursing note reviewed.   Constitutional:       Appearance: She is well-developed.   HENT:      Head: Normocephalic and atraumatic.      Nose: Mucosal edema and congestion present.   Cardiovascular:      Rate and Rhythm: Normal rate and regular rhythm.   Pulmonary:      Effort: Pulmonary effort is normal.      Breath sounds: Normal breath sounds.   Neurological:      General: No focal deficit present.      Mental Status: She is alert.   Psychiatric:         Mood and Affect: Mood normal.         Behavior: Behavior normal.       Samy Valenzuela MD    "

## 2024-05-01 NOTE — ASSESSMENT & PLAN NOTE
Continues to follow with therapist.  Prozac 20 mg daily.  Significant improvement.  Continue current medications

## 2024-05-22 DIAGNOSIS — F33.1 MODERATE EPISODE OF RECURRENT MAJOR DEPRESSIVE DISORDER (HCC): ICD-10-CM

## 2024-05-23 RX ORDER — FLUOXETINE HYDROCHLORIDE 20 MG/1
20 CAPSULE ORAL DAILY
Qty: 90 CAPSULE | Refills: 1 | Status: SHIPPED | OUTPATIENT
Start: 2024-05-23 | End: 2024-11-19

## 2024-05-29 ENCOUNTER — TELEPHONE (OUTPATIENT)
Dept: PSYCHIATRY | Facility: CLINIC | Age: 39
End: 2024-05-29

## 2024-05-29 NOTE — TELEPHONE ENCOUNTER
Per chart review, pt was reached out via [employer]. No response was given.     Pt removed from wait list at this time.

## 2024-07-08 ENCOUNTER — OFFICE VISIT (OUTPATIENT)
Dept: FAMILY MEDICINE CLINIC | Facility: CLINIC | Age: 39
End: 2024-07-08
Payer: COMMERCIAL

## 2024-07-08 VITALS
SYSTOLIC BLOOD PRESSURE: 124 MMHG | DIASTOLIC BLOOD PRESSURE: 82 MMHG | HEIGHT: 65 IN | OXYGEN SATURATION: 98 % | BODY MASS INDEX: 24.16 KG/M2 | TEMPERATURE: 98 F | WEIGHT: 145 LBS | RESPIRATION RATE: 18 BRPM | HEART RATE: 84 BPM

## 2024-07-08 DIAGNOSIS — R05.1 ACUTE COUGH: Primary | ICD-10-CM

## 2024-07-08 PROCEDURE — 99213 OFFICE O/P EST LOW 20 MIN: CPT | Performed by: FAMILY MEDICINE

## 2024-07-08 RX ORDER — PREDNISONE 20 MG/1
40 TABLET ORAL DAILY
Qty: 10 TABLET | Refills: 0 | Status: SHIPPED | OUTPATIENT
Start: 2024-07-08 | End: 2024-07-13

## 2024-07-08 RX ORDER — FLUTICASONE PROPIONATE 50 MCG
2 SPRAY, SUSPENSION (ML) NASAL DAILY
COMMUNITY
Start: 2024-06-29

## 2024-07-08 RX ORDER — DEXTROMETHORPHAN HYDROBROMIDE AND PROMETHAZINE HYDROCHLORIDE 15; 6.25 MG/5ML; MG/5ML
5 SYRUP ORAL 4 TIMES DAILY PRN
COMMUNITY
Start: 2024-06-29 | End: 2024-07-08

## 2024-07-08 RX ORDER — PROMETHAZINE HYDROCHLORIDE AND CODEINE PHOSPHATE 6.25; 1 MG/5ML; MG/5ML
5 SYRUP ORAL EVERY 4 HOURS PRN
Qty: 240 ML | Refills: 0 | Status: SHIPPED | OUTPATIENT
Start: 2024-07-08

## 2024-07-08 RX ORDER — BENZONATATE 100 MG/1
100 CAPSULE ORAL 3 TIMES DAILY PRN
Qty: 20 CAPSULE | Refills: 0 | Status: SHIPPED | OUTPATIENT
Start: 2024-07-08

## 2024-07-08 NOTE — PROGRESS NOTES
Ambulatory Visit  Name: Corie Hylton      : 1985      MRN: [id number]  Encounter Provider: Samy Valenzuela MD  Encounter Date: 2024   Encounter department: [facility]    Assessment & Plan   1. Acute cough  -     promethazine-codeine (PHENERGAN WITH CODEINE) 6.25-10 mg/5 mL syrup; Take 5 mL by mouth every 4 (four) hours as needed for cough  -     benzonatate (TESSALON PERLES) 100 mg capsule; Take 1 capsule (100 mg total) by mouth 3 (three) times a day as needed for cough  -     predniSONE 20 mg tablet; Take 2 tablets (40 mg total) by mouth daily for 5 days    Continues to have debilitating nonproductive cough.  Switch to Phenergan with codeine.  Trial patient on Tessalon Perles 100 mg 3 times daily as needed.  Will also start patient on 40 mg of prednisone for the next 5 days.  No indications for antibiotic at this time.  If no improvement can consider chest x-ray.  Patient understands and agrees with plan       History of Present Illness     Patient presents with:  Follow-up: Urgent care follow up, patient was seen at the urgent care last Saturday for a cough she was given prednisone, fluticasone, promethazine.  She is experiencing difficulty sleeping due to the cough, she is done with her steriod.          Review of Systems   Constitutional:  Negative for activity change, fatigue and fever.   Eyes:  Negative for visual disturbance.   Respiratory:  Positive for cough. Negative for shortness of breath.    Cardiovascular:  Negative for chest pain.   Gastrointestinal:  Negative for abdominal pain, constipation, diarrhea and nausea.   Endocrine: Negative for cold intolerance and heat intolerance.   Musculoskeletal:  Negative for back pain.   Skin:  Negative for rash.   Neurological:  Negative for headaches.   Psychiatric/Behavioral:  Negative for confusion.      Past Medical History:   Diagnosis Date    Depression 10/18/2021    Miscarriage      Past Surgical History:   Procedure  "Laterality Date    WISDOM TOOTH EXTRACTION       Family History   Problem Relation Age of Onset    Alcohol abuse Mother     Alcohol abuse Father     Depression Father     Miscarriages / Stillbirths Sister     Emphysema Maternal Grandfather 67    Colon cancer Paternal Grandmother 91    Miscarriages / Stillbirths Paternal Grandmother     Colon cancer Paternal Grandfather      Social History     Tobacco Use    Smoking status: Never    Smokeless tobacco: Never   Vaping Use    Vaping status: Never Used   Substance and Sexual Activity    Alcohol use: Yes     Alcohol/week: 7.0 standard drinks of alcohol     Types: 7 Glasses of wine per week     Comment: social    Drug use: Not Currently     Types: Marijuana    Sexual activity: Yes     Partners: Male     Birth control/protection: Condom Male     Current Outpatient Medications on File Prior to Visit   Medication Sig    ergocalciferol (VITAMIN D2) 50,000 units Take 1 capsule (50,000 Units total) by mouth once a week for 12 doses    FLUoxetine (PROzac) 20 mg capsule Take 1 capsule (20 mg total) by mouth daily    fluticasone (FLONASE) 50 mcg/act nasal spray 2 sprays into each nostril daily    Multiple Vitamin (multivitamin) tablet Take 1 tablet by mouth daily    [DISCONTINUED] promethazine-dextromethorphan (PHENERGAN-DM) 6.25-15 mg/5 mL oral syrup Take 5 mL by mouth 4 (four) times a day as needed     No Known Allergies  Immunization History   Administered Date(s) Administered    COVID-19 MODERNA VACC 0.5 ML IM 03/17/2021, 04/14/2021    COVID-19 Pfizer Vac BIVALENT [place]-sucrose 12 Yr+ IM 11/14/2022    Tdap 05/01/2024     Objective     /82 (BP Location: Left arm, Patient Position: Sitting, Cuff Size: Standard)   Pulse 84   Temp 98 °F (36.7 °C) (Temporal)   Resp 18   Ht 5' 5\" (1.651 m)   Wt 65.8 kg (145 lb)   SpO2 98%   BMI 24.13 kg/m²     Physical Exam  Vitals and nursing note reviewed.   Constitutional:       Appearance: Normal appearance. She is well-developed. "   HENT:      Head: Normocephalic and atraumatic.   Cardiovascular:      Rate and Rhythm: Normal rate and regular rhythm.   Pulmonary:      Effort: Pulmonary effort is normal.      Breath sounds: Normal breath sounds.   Abdominal:      General: Bowel sounds are normal.      Palpations: Abdomen is soft.   Musculoskeletal:      Cervical back: Normal range of motion.   Skin:     General: Skin is warm and dry.   Neurological:      General: No focal deficit present.      Mental Status: She is alert and oriented to person, place, and time.   Psychiatric:         Mood and Affect: Mood normal.         Speech: Speech normal.         Behavior: Behavior normal.       Administrative Statements

## 2024-11-20 ENCOUNTER — OFFICE VISIT (OUTPATIENT)
Dept: FAMILY MEDICINE CLINIC | Facility: CLINIC | Age: 39
End: 2024-11-20
Payer: COMMERCIAL

## 2024-11-20 VITALS
DIASTOLIC BLOOD PRESSURE: 68 MMHG | HEART RATE: 87 BPM | HEIGHT: 65 IN | OXYGEN SATURATION: 97 % | TEMPERATURE: 98.7 F | WEIGHT: 145 LBS | BODY MASS INDEX: 24.16 KG/M2 | SYSTOLIC BLOOD PRESSURE: 118 MMHG | RESPIRATION RATE: 16 BRPM

## 2024-11-20 DIAGNOSIS — K52.9 GASTROENTERITIS: Primary | ICD-10-CM

## 2024-11-20 PROCEDURE — 99213 OFFICE O/P EST LOW 20 MIN: CPT | Performed by: FAMILY MEDICINE

## 2024-11-20 RX ORDER — LOPERAMIDE HYDROCHLORIDE 2 MG/1
2 CAPSULE ORAL 4 TIMES DAILY PRN
Qty: 30 CAPSULE | Refills: 0 | Status: SHIPPED | OUTPATIENT
Start: 2024-11-20

## 2024-11-20 RX ORDER — ONDANSETRON 4 MG/1
4 TABLET, ORALLY DISINTEGRATING ORAL EVERY 6 HOURS PRN
Qty: 30 TABLET | Refills: 0 | Status: SHIPPED | OUTPATIENT
Start: 2024-11-20

## 2024-11-20 NOTE — PROGRESS NOTES
"Name: Corie Hylton      : 1985      MRN: [id number]  Encounter Provider: Samy Valenzuela MD  Encounter Date: 2024   Encounter department: [facility]  :  Assessment & Plan  Gastroenteritis  Suspect viral gastroenteritis    and daughter had similar symptoms which lasted around 24 hours. No fever or blood.  Start Zofran and Imodium.  If no improvement in next 24 to 48 hours can obtain stool studies  Orders:    ondansetron (ZOFRAN-ODT) 4 mg disintegrating tablet; Take 1 tablet (4 mg total) by mouth every 6 (six) hours as needed for nausea or vomiting    loperamide (IMODIUM) 2 mg capsule; Take 1 capsule (2 mg total) by mouth 4 (four) times a day as needed for diarrhea           History of Present Illness     Patient presents today with diarrhea and vomiting for the past 36 hours.   and daughter had similar symptoms but have resolved.  No known travel.  They do have well water.  She denies any fevers.  Struggling to keep any food down.  Denies any chills fever night sweats or abdominal pain.  Has not tried any medications    Diarrhea   Associated symptoms include vomiting. Pertinent negatives include no abdominal pain, fever or headaches.     Review of Systems   Constitutional:  Negative for activity change, fatigue and fever.   Eyes:  Negative for visual disturbance.   Respiratory:  Negative for shortness of breath.    Cardiovascular:  Negative for chest pain.   Gastrointestinal:  Positive for diarrhea and vomiting. Negative for abdominal pain, constipation and nausea.   Endocrine: Negative for cold intolerance and heat intolerance.   Musculoskeletal:  Negative for back pain.   Skin:  Negative for rash.   Neurological:  Negative for headaches.   Psychiatric/Behavioral:  Negative for confusion.           Objective   /68 (BP Location: Left arm, Patient Position: Sitting, Cuff Size: Standard)   Pulse 87   Temp 98.7 °F (37.1 °C)   Resp 16   Ht 5' 5\" (1.651 m)  "  Wt 65.8 kg (145 lb)   SpO2 97%   BMI 24.13 kg/m²      Physical Exam  Vitals and nursing note reviewed.   Constitutional:       Appearance: Normal appearance. She is well-developed.   HENT:      Head: Normocephalic and atraumatic.   Cardiovascular:      Rate and Rhythm: Normal rate and regular rhythm.   Pulmonary:      Effort: Pulmonary effort is normal.      Breath sounds: Normal breath sounds.   Abdominal:      Palpations: Abdomen is soft.      Comments: Increased bowel sounds   Musculoskeletal:      Cervical back: Normal range of motion.   Skin:     General: Skin is warm.   Neurological:      General: No focal deficit present.      Mental Status: She is alert.   Psychiatric:         Mood and Affect: Mood normal.         Speech: Speech normal.

## 2024-12-12 ENCOUNTER — OFFICE VISIT (OUTPATIENT)
Dept: OBGYN CLINIC | Facility: CLINIC | Age: 39
End: 2024-12-12
Payer: COMMERCIAL

## 2024-12-12 VITALS
BODY MASS INDEX: 24.79 KG/M2 | HEART RATE: 70 BPM | SYSTOLIC BLOOD PRESSURE: 120 MMHG | WEIGHT: 149 LBS | DIASTOLIC BLOOD PRESSURE: 60 MMHG | OXYGEN SATURATION: 100 %

## 2024-12-12 DIAGNOSIS — Z01.419 WOMEN'S ANNUAL ROUTINE GYNECOLOGICAL EXAMINATION: Primary | ICD-10-CM

## 2024-12-12 PROCEDURE — G0476 HPV COMBO ASSAY CA SCREEN: HCPCS | Performed by: NURSE PRACTITIONER

## 2024-12-12 PROCEDURE — G0145 SCR C/V CYTO,THINLAYER,RESCR: HCPCS | Performed by: NURSE PRACTITIONER

## 2024-12-12 PROCEDURE — S0612 ANNUAL GYNECOLOGICAL EXAMINA: HCPCS | Performed by: NURSE PRACTITIONER

## 2024-12-12 NOTE — PROGRESS NOTES
Subjective    HPI:     Corie Hylton is a 39 y.o. female. She is a  2 Para 1, with a . Her menstrual cycles have been monthly now for 3 months. Her current method of contraception includes condoms. Interested in a consultation for a tubal ligation. In a stable relationship for 16 yrs. They are not intimate. She denies /GI and Gyn complaints. She feels safe at home. She  states her   depression/anxiety is stable  Medical, surgical and family history reviewed. Her dental care is up-to-date. She eats a healthy diet and exercises regularly. She is happy with her weight.     Visit Vitals  /60   Pulse 70   Wt 67.6 kg (149 lb)   LMP 2024   SpO2 100%   BMI 24.79 kg/m²   OB Status Having periods   Smoking Status Never   BSA 1.75 m²       Gynecologic History    Patient's last menstrual period was 2024.    Last Pap: 2021. Results were: normal    Obstetric and Medical History    OB History    Para Term  AB Living   2 1 1  1 1   SAB IAB Ectopic Multiple Live Births   1    1      # Outcome Date GA Lbr Morales/2nd Weight Sex Type Anes PTL Lv   2 Term 01/10/19 41w0d  3884 g (8 lb 9 oz) F Vag-Spont  Y NADIRA   1 SAB                Past Medical History:   Diagnosis Date    Depression 10/18/2021    Miscarriage        Past Surgical History:   Procedure Laterality Date    WISDOM TOOTH EXTRACTION         The following portions of the patient's history were reviewed and updated as appropriate: allergies, current medications, past family history, past medical history, past social history, past surgical history, and problem list.    Review of Systems    Pertinent items are noted in HPI.      Objective    Physical Exam  Constitutional:       Appearance: Normal appearance. She is well-developed.   Genitourinary:      Vulva, bladder and urethral meatus normal.      No lesions in the vagina.      Right Labia: No rash, tenderness, lesions, skin changes or Bartholin's cyst.     Left Labia: No  tenderness, lesions, skin changes, Bartholin's cyst or rash.     No labial fusion noted.      No inguinal adenopathy present in the right or left side.     No vaginal discharge, erythema, tenderness, bleeding or granulation tissue.      No vaginal prolapse present.     No vaginal atrophy present.       Right Adnexa: not tender, not full and no mass present.     Left Adnexa: not tender, not full and no mass present.     Cervix is parous.      No cervical motion tenderness, discharge, friability, lesion, polyp or nabothian cyst.      Uterus is not enlarged, tender, irregular or prolapsed.      No uterine mass detected.     Uterus is anteverted.      Pelvic exam was performed with patient in the lithotomy position.   Breasts:     Breasts are symmetrical.      Right: No inverted nipple, mass, nipple discharge, skin change or tenderness.      Left: No inverted nipple, mass, nipple discharge, skin change or tenderness.   HENT:      Head: Normocephalic and atraumatic.   Neck:      Thyroid: No thyromegaly.   Cardiovascular:      Rate and Rhythm: Normal rate and regular rhythm.      Heart sounds: Normal heart sounds, S1 normal and S2 normal.   Pulmonary:      Effort: Pulmonary effort is normal.      Breath sounds: Normal breath sounds.   Abdominal:      General: Bowel sounds are normal. There is no distension.      Palpations: Abdomen is soft. There is no mass.      Tenderness: There is no abdominal tenderness. There is no guarding.      Hernia: There is no hernia in the left inguinal area or right inguinal area.   Musculoskeletal:      Cervical back: Neck supple.   Lymphadenopathy:      Cervical: No cervical adenopathy.      Upper Body:      Right upper body: No supraclavicular or axillary adenopathy.      Left upper body: No supraclavicular or axillary adenopathy.      Lower Body: No right inguinal adenopathy. No left inguinal adenopathy.   Neurological:      Mental Status: She is alert.   Skin:     General: Skin is warm  and dry.      Findings: No rash.   Psychiatric:         Attention and Perception: Attention and perception normal.         Mood and Affect: Mood and affect normal.         Speech: Speech normal.         Behavior: Behavior is cooperative.         Thought Content: Thought content normal.         Cognition and Memory: Cognition and memory normal.         Judgment: Judgment normal.   Vitals and nursing note reviewed.          Assessment and Plan    Corie was seen today for gynecologic exam.    Diagnoses and all orders for this visit:    Women's annual routine gynecological examination        Patient informed of a Stable GYN exam. A pap smear was performed.     I have discussed the importance of exercise and healthy diet as well as adequate intake of calcium and vitamin D. The current ASCCP guidelines were reviewed. The low risk patient will receive pap smear screening every 3 years until the age of 29 and then every 3 to 5 years with HPV co-testing from the ages of 30-65. I emphasized the importance of an annual pelvic and breast exam. A yearly mammogram is recommended for breast cancer screening starting at age 40.       Results will be released to [facility], if abnormal will call to review and discuss treatment plan.     All questions have been answered to her satisfaction.       Contraception: condoms. Schedule consult appt for tubal ligation.  Follow up in: 1 year or sooner if needed

## 2024-12-16 ENCOUNTER — RESULTS FOLLOW-UP (OUTPATIENT)
Dept: OBGYN CLINIC | Facility: CLINIC | Age: 39
End: 2024-12-16

## 2024-12-16 DIAGNOSIS — B37.31 VAGINAL CANDIDA: Primary | ICD-10-CM

## 2024-12-19 DIAGNOSIS — F33.1 MODERATE EPISODE OF RECURRENT MAJOR DEPRESSIVE DISORDER (HCC): ICD-10-CM

## 2024-12-20 LAB
LAB AP GYN PRIMARY INTERPRETATION: NORMAL
Lab: NORMAL
PATH INTERP SPEC-IMP: NORMAL

## 2024-12-23 RX ORDER — FLUCONAZOLE 150 MG/1
150 TABLET ORAL ONCE
Qty: 1 TABLET | Refills: 0 | Status: SHIPPED | OUTPATIENT
Start: 2024-12-23 | End: 2024-12-23

## 2025-01-03 ENCOUNTER — RA CDI HCC (OUTPATIENT)
Dept: OTHER | Facility: HOSPITAL | Age: 40
End: 2025-01-03

## 2025-01-10 ENCOUNTER — OFFICE VISIT (OUTPATIENT)
Dept: FAMILY MEDICINE CLINIC | Facility: CLINIC | Age: 40
End: 2025-01-10
Payer: COMMERCIAL

## 2025-01-10 VITALS
SYSTOLIC BLOOD PRESSURE: 120 MMHG | OXYGEN SATURATION: 98 % | BODY MASS INDEX: 25.33 KG/M2 | RESPIRATION RATE: 16 BRPM | HEIGHT: 65 IN | TEMPERATURE: 98.4 F | DIASTOLIC BLOOD PRESSURE: 82 MMHG | WEIGHT: 152 LBS | HEART RATE: 75 BPM

## 2025-01-10 DIAGNOSIS — Z00.00 ANNUAL PHYSICAL EXAM: Primary | ICD-10-CM

## 2025-01-10 DIAGNOSIS — E55.9 VITAMIN D DEFICIENCY: ICD-10-CM

## 2025-01-10 DIAGNOSIS — R53.83 OTHER FATIGUE: ICD-10-CM

## 2025-01-10 DIAGNOSIS — F33.1 MODERATE EPISODE OF RECURRENT MAJOR DEPRESSIVE DISORDER (HCC): ICD-10-CM

## 2025-01-10 PROCEDURE — 99395 PREV VISIT EST AGE 18-39: CPT | Performed by: FAMILY MEDICINE

## 2025-01-10 RX ORDER — FLUCONAZOLE 150 MG/1
TABLET ORAL
COMMUNITY
Start: 2025-01-09

## 2025-01-10 NOTE — PROGRESS NOTES
Adult Annual Physical  Name: Corie Hylton      : 1985      MRN: [id number]  Encounter Provider: Samy Valenzuela MD  Encounter Date: 1/10/2025   Encounter department: [facility]    Assessment & Plan  Annual physical exam         Moderate episode of recurrent major depressive disorder (HCC)           Vitamin D deficiency    Orders:    Vitamin D 25 hydroxy; Future    Other fatigue    Orders:    TSH, 3rd generation with Free T4 reflex; Future    Basic metabolic panel; Future    CBC and Platelet; Future        Annual physical completed today.  Patient with plans on tapering off her Prozac.  Plan to decrease to 10 mg when she is ready.  Follow-up for annual physical obtain lab work  Immunizations and preventive care screenings were discussed with patient today. Appropriate education was printed on patient's after visit summary.    Counseling:  Alcohol/drug use: discussed moderation in alcohol intake, the recommendations for healthy alcohol use, and avoidance of illicit drug use.  Dental Health: discussed importance of regular tooth brushing, flossing, and dental visits.  Injury prevention: discussed safety/seat belts, safety helmets, smoke detectors, carbon monoxide detectors, and smoking near bedding or upholstery.  Sexual health: discussed sexually transmitted diseases, partner selection, use of condoms, avoidance of unintended pregnancy, and contraceptive alternatives.  Exercise: the importance of regular exercise/physical activity was discussed. Recommend exercise 3-5 times per week for at least 30 minutes.          History of Present Illness     Adult Annual Physical:  Patient presents for annual physical.     Diet and Physical Activity:  - Diet/Nutrition: well balanced diet.  - Exercise: walking.    General Health:  - Sleep: sleeps well.  - Hearing: normal hearing bilateral ears.  - Vision: no vision problems.  - Dental: regular dental visits.    /GYN Health:  - Follows with GYN:  "yes.     Review of Systems   Constitutional:  Negative for activity change, fatigue and fever.   Eyes:  Negative for visual disturbance.   Respiratory:  Negative for shortness of breath.    Cardiovascular:  Negative for chest pain.   Gastrointestinal:  Negative for abdominal pain, constipation, diarrhea and nausea.   Endocrine: Negative for cold intolerance and heat intolerance.   Musculoskeletal:  Negative for back pain.   Skin:  Negative for rash.   Neurological:  Negative for headaches.   Psychiatric/Behavioral:  Negative for confusion.          Objective   /82 (BP Location: Left arm, Patient Position: Sitting, Cuff Size: Large)   Pulse 75   Temp 98.4 °F (36.9 °C) (Temporal)   Resp 16   Ht 5' 5\" (1.651 m)   Wt 68.9 kg (152 lb)   LMP 12/02/2024   SpO2 98%   BMI 25.29 kg/m²     Physical Exam  Vitals and nursing note reviewed.   Constitutional:       Appearance: Normal appearance. She is well-developed.   HENT:      Head: Normocephalic and atraumatic.   Cardiovascular:      Rate and Rhythm: Normal rate and regular rhythm.   Pulmonary:      Effort: Pulmonary effort is normal.      Breath sounds: Normal breath sounds.   Abdominal:      General: Bowel sounds are normal.      Palpations: Abdomen is soft.   Musculoskeletal:      Cervical back: Normal range of motion.   Skin:     General: Skin is warm.   Neurological:      General: No focal deficit present.      Mental Status: She is alert.   Psychiatric:         Mood and Affect: Mood normal.         Speech: Speech normal.         "

## 2025-06-05 DIAGNOSIS — F33.1 MODERATE EPISODE OF RECURRENT MAJOR DEPRESSIVE DISORDER (HCC): Primary | ICD-10-CM

## 2025-06-05 RX ORDER — FLUOXETINE 10 MG/1
10 CAPSULE ORAL DAILY
Qty: 30 CAPSULE | Refills: 1 | Status: SHIPPED | OUTPATIENT
Start: 2025-06-05